# Patient Record
Sex: MALE | Race: WHITE | HISPANIC OR LATINO | Employment: FULL TIME | URBAN - METROPOLITAN AREA
[De-identification: names, ages, dates, MRNs, and addresses within clinical notes are randomized per-mention and may not be internally consistent; named-entity substitution may affect disease eponyms.]

---

## 2022-12-12 NOTE — PROGRESS NOTES
Name: Leslee Saavedra      : 1980      MRN: [id number]  Encounter Provider: JASWANT Chang  Encounter Date: 2022   Encounter department: [address] [place] [address]     1  Encounter for medical examination to establish care    2  Primary hypertension  Assessment & Plan:  BP Readings from Last 3 Encounters:   22 138/82     - Continue vasotec 20 mg daily  -reviewed eating a low salt diet (such as the DASH diet), limiting alcohol, exercising, and wt loss  Orders:  -     enalapril (VASOTEC) 20 mg tablet; Take 1 tablet (20 mg total) by mouth daily    3  Obesity (BMI 30-39  9)  Assessment & Plan:  BMI Counseling: Body mass index is 30 54 kg/m²  The BMI is above normal  Nutrition recommendations include reducing portion sizes, decreasing overall calorie intake, 3-5 servings of fruits/vegetables daily, reducing fast food intake, consuming healthier snacks, decreasing soda and/or juice intake, moderation in carbohydrate intake, increasing intake of lean protein, reducing intake of saturated fat and trans fat and reducing intake of cholesterol  Exercise recommendations include moderate aerobic physical activity for 150 minutes/week  Orders:  -     Hemoglobin A1C; Future  -     Basic metabolic panel; Future  -     Lipid Panel with Direct LDL reflex; Future    4  Encounter for immunization  -     Age 15 y+, BOOSTER (BIVALENT): Kris Chapin vac bivalent saul-sucr    5  Neck pain  Assessment & Plan:  - Recommend heat/ ice application, otc topicals, home exercises, and tylenol 500 mg prn if non oral medication methods fail   - Cannot do PT at this time due to costs  Pt is uninsured  6  Weight loss  Assessment & Plan:  - Will check A1c  - If a1c does not show diabetes, consider other further testing to assess for other causes  BMI Counseling: Body mass index is 30 54 kg/m²   The BMI is above normal  Nutrition recommendations include decreasing portion sizes, encouraging healthy choices of fruits and vegetables, decreasing fast food intake, consuming healthier snacks, limiting drinks that contain sugar, moderation in carbohydrate intake, increasing intake of lean protein, reducing intake of saturated and trans fat and reducing intake of cholesterol  Exercise recommendations include moderate physical activity 150 minutes/week  No pharmacotherapy was ordered  Rationale for BMI follow-up plan is due to patient being overweight or obese  Depression Screening and Follow-up Plan: Patient was screened for depression during today's encounter  They screened negative with a PHQ-2 score of 0  Subjective      Chloe Grewal is a 38 y o  male  With pmh of HTN  has no past surgical history on file  He presents today to [facility]  Neck Pain  Patient complains of neck pain  Event that precipitated these symptoms: none known  Onset of symptoms multiple years ago, and have been unchanged since that time  Current symptoms are pain in posterior neck (aching and throbbing in character; 5/10 in severity)  Patient denies numbness, paresthesias, stiffness and weakness in  Patient has had no prior neck problems  Previous treatments include: massage with relief  He also reports unwanted weight loss over the last year  He was previously weighing around 220-225 and he is down about 30 lbs  He denies constitutional symptoms as per ROS  He also reports polydipsia, polyuria, and fatigue  He reports a family hx of diabetes  Review of Systems   Constitutional: Positive for fatigue and unexpected weight change  Negative for chills and fever  HENT: Negative for ear pain and sore throat  Eyes: Negative for pain and visual disturbance  Respiratory: Negative for cough and shortness of breath  Cardiovascular: Negative for chest pain and palpitations  Gastrointestinal: Negative for abdominal pain and vomiting     Endocrine: Positive for polydipsia and polyuria  Genitourinary: Negative for dysuria and hematuria  Musculoskeletal: Negative for arthralgias and back pain  Skin: Negative for color change and rash  Neurological: Negative for seizures and syncope  All other systems reviewed and are negative  No current outpatient medications on file prior to visit  Objective     /82 (BP Location: Left arm, Patient Position: Sitting, Cuff Size: Large)   Pulse 68   Temp 97 8 °F (36 6 °C) (Temporal)   Resp 18   Ht 5' 7" (1 702 m)   Wt 88 5 kg (195 lb)   SpO2 97%   BMI 30 54 kg/m²     Physical Exam  Vitals and nursing note reviewed  Constitutional:       General: He is not in acute distress  Appearance: He is obese  He is not ill-appearing  HENT:      Head: Normocephalic and atraumatic  Right Ear: External ear normal  There is no impacted cerumen  Left Ear: External ear normal  There is no impacted cerumen  Nose: Nose normal  No congestion  Eyes:      Extraocular Movements: Extraocular movements intact  Conjunctiva/sclera: Conjunctivae normal       Pupils: Pupils are equal, round, and reactive to light  Cardiovascular:      Rate and Rhythm: Normal rate and regular rhythm  Pulses: Normal pulses  Heart sounds: Normal heart sounds  No murmur heard  Pulmonary:      Effort: Pulmonary effort is normal       Breath sounds: Normal breath sounds  Abdominal:      General: Bowel sounds are normal       Palpations: Abdomen is soft  Tenderness: There is no abdominal tenderness  Musculoskeletal:         General: No tenderness  Normal range of motion  Cervical back: Normal range of motion  No tenderness  Skin:     General: Skin is warm and dry  Neurological:      General: No focal deficit present  Mental Status: He is alert and oriented to person, place, and time     Psychiatric:         Mood and Affect: Mood normal          Behavior: Behavior normal          Thought Content: Thought content normal          Judgment: Judgment normal        Jessica Blanchard

## 2022-12-13 ENCOUNTER — OFFICE VISIT (OUTPATIENT)
Dept: FAMILY MEDICINE CLINIC | Facility: CLINIC | Age: 42
End: 2022-12-13

## 2022-12-13 ENCOUNTER — APPOINTMENT (OUTPATIENT)
Dept: LAB | Facility: HOSPITAL | Age: 42
End: 2022-12-13

## 2022-12-13 VITALS
HEART RATE: 68 BPM | BODY MASS INDEX: 30.61 KG/M2 | TEMPERATURE: 97.8 F | DIASTOLIC BLOOD PRESSURE: 82 MMHG | SYSTOLIC BLOOD PRESSURE: 138 MMHG | RESPIRATION RATE: 18 BRPM | OXYGEN SATURATION: 97 % | HEIGHT: 67 IN | WEIGHT: 195 LBS

## 2022-12-13 DIAGNOSIS — I10 PRIMARY HYPERTENSION: ICD-10-CM

## 2022-12-13 DIAGNOSIS — E66.9 OBESITY (BMI 30-39.9): ICD-10-CM

## 2022-12-13 DIAGNOSIS — Z23 ENCOUNTER FOR IMMUNIZATION: ICD-10-CM

## 2022-12-13 DIAGNOSIS — M54.2 NECK PAIN: ICD-10-CM

## 2022-12-13 DIAGNOSIS — Z00.00 ENCOUNTER FOR MEDICAL EXAMINATION TO ESTABLISH CARE: Primary | ICD-10-CM

## 2022-12-13 DIAGNOSIS — R63.4 WEIGHT LOSS: ICD-10-CM

## 2022-12-13 PROBLEM — Z59.89 UNINSURED: Status: ACTIVE | Noted: 2022-12-13

## 2022-12-13 PROBLEM — Z59.71 UNINSURED: Status: ACTIVE | Noted: 2022-12-13

## 2022-12-13 LAB
ANION GAP SERPL CALCULATED.3IONS-SCNC: 9 MMOL/L (ref 5–14)
BUN SERPL-MCNC: 11 MG/DL (ref 5–25)
CALCIUM SERPL-MCNC: 9 MG/DL (ref 8.4–10.2)
CHLORIDE SERPL-SCNC: 103 MMOL/L (ref 96–108)
CHOLEST SERPL-MCNC: 251 MG/DL
CO2 SERPL-SCNC: 25 MMOL/L (ref 21–32)
CREAT SERPL-MCNC: 0.52 MG/DL (ref 0.7–1.5)
GFR SERPL CREATININE-BSD FRML MDRD: 129 ML/MIN/1.73SQ M
GLUCOSE P FAST SERPL-MCNC: 320 MG/DL (ref 70–99)
HDLC SERPL-MCNC: 41 MG/DL
LDLC SERPL CALC-MCNC: 163 MG/DL
POTASSIUM SERPL-SCNC: 3.9 MMOL/L (ref 3.5–5.3)
SODIUM SERPL-SCNC: 137 MMOL/L (ref 135–147)
TRIGL SERPL-MCNC: 235 MG/DL

## 2022-12-13 RX ORDER — ENALAPRIL MALEATE 20 MG/1
20 TABLET ORAL DAILY
Qty: 90 TABLET | Refills: 1 | Status: SHIPPED | OUTPATIENT
Start: 2022-12-13

## 2022-12-13 NOTE — ASSESSMENT & PLAN NOTE
- Will check A1c  - If a1c does not show diabetes, consider other further testing to assess for other causes

## 2022-12-13 NOTE — ASSESSMENT & PLAN NOTE
- Recommend heat/ ice application, otc topicals, home exercises, and tylenol 500 mg prn if non oral medication methods fail   - Cannot do PT at this time due to costs  Pt is uninsured

## 2022-12-13 NOTE — ASSESSMENT & PLAN NOTE
BP Readings from Last 3 Encounters:   12/13/22 138/82     - Continue vasotec 20 mg daily  -reviewed eating a low salt diet (such as the DASH diet), limiting alcohol, exercising, and wt loss

## 2022-12-14 LAB
EST. AVERAGE GLUCOSE BLD GHB EST-MCNC: 315 MG/DL
HBA1C MFR BLD: 12.6 %

## 2022-12-29 ENCOUNTER — TELEPHONE (OUTPATIENT)
Dept: FAMILY MEDICINE CLINIC | Facility: CLINIC | Age: 42
End: 2022-12-29

## 2022-12-29 NOTE — TELEPHONE ENCOUNTER
first attempt to contact patient  no answer    called, mb is not set up yet, couldn't leave a message that appointment needs to be rescheduled   providers schedule changed

## 2022-12-30 NOTE — TELEPHONE ENCOUNTER
second attempt to contact patient  no answer      called, mb is not set up yet, couldn't leave a message that appointment needs to be rescheduled   providers schedule changed

## 2023-01-03 ENCOUNTER — OFFICE VISIT (OUTPATIENT)
Dept: FAMILY MEDICINE CLINIC | Facility: CLINIC | Age: 43
End: 2023-01-03

## 2023-01-03 VITALS
SYSTOLIC BLOOD PRESSURE: 128 MMHG | RESPIRATION RATE: 18 BRPM | BODY MASS INDEX: 30.59 KG/M2 | HEIGHT: 67 IN | DIASTOLIC BLOOD PRESSURE: 80 MMHG | HEART RATE: 87 BPM | TEMPERATURE: 97.8 F | WEIGHT: 194.9 LBS

## 2023-01-03 DIAGNOSIS — I10 PRIMARY HYPERTENSION: ICD-10-CM

## 2023-01-03 DIAGNOSIS — E11.9 DIABETES MELLITUS, NEW ONSET (HCC): Primary | ICD-10-CM

## 2023-01-03 DIAGNOSIS — E78.2 MIXED HYPERLIPIDEMIA: ICD-10-CM

## 2023-01-03 PROBLEM — R63.4 WEIGHT LOSS: Status: RESOLVED | Noted: 2022-12-13 | Resolved: 2023-01-03

## 2023-01-03 PROBLEM — Z00.00 ENCOUNTER FOR MEDICAL EXAMINATION TO ESTABLISH CARE: Status: RESOLVED | Noted: 2022-12-13 | Resolved: 2023-01-03

## 2023-01-03 RX ORDER — LANCETS 33 GAUGE
EACH MISCELLANEOUS
Qty: 300 EACH | Refills: 3 | Status: SHIPPED | OUTPATIENT
Start: 2023-01-03

## 2023-01-03 RX ORDER — BLOOD-GLUCOSE METER
KIT MISCELLANEOUS
Qty: 1 KIT | Refills: 0 | Status: SHIPPED | OUTPATIENT
Start: 2023-01-03

## 2023-01-03 RX ORDER — GLIPIZIDE 5 MG/1
5 TABLET ORAL
Qty: 90 TABLET | Refills: 1 | Status: SHIPPED | OUTPATIENT
Start: 2023-01-03

## 2023-01-03 RX ORDER — BLOOD SUGAR DIAGNOSTIC
STRIP MISCELLANEOUS
Qty: 300 EACH | Refills: 3 | Status: SHIPPED | OUTPATIENT
Start: 2023-01-03

## 2023-01-03 RX ORDER — ATORVASTATIN CALCIUM 40 MG/1
40 TABLET, FILM COATED ORAL EVERY EVENING
Qty: 90 TABLET | Refills: 1 | Status: SHIPPED | OUTPATIENT
Start: 2023-01-03 | End: 2023-07-02

## 2023-01-03 NOTE — PROGRESS NOTES
Name: Sangeeta Pool      : 1980      MRN: [id number]  Encounter Provider: JASWANT Trinidad  Encounter Date: 1/3/2023   Encounter department: [address] [place] [address]     1  Diabetes mellitus, new onset [facility])  Assessment & Plan:    Lab Results   Component Value Date    HGBA1C 12 6 (H) 2022   -Reviewed pathophysiology of diabetes  -Discussed lifestyle management to aid diabetes management such as tobacco cessation, limiting alcohol, eating healthy, carb counting, exercise, and weight loss   -Educated pt on complications of uncontrolled diabetes & persistent hyperglycemia, including but not limited to DKA/HHS, CKD, nerve damage, heart disease, stroke, extremity amputation, vision loss  -Denies excessive thirst, excessive urination, blurry vision, confusion  -Will start Metformin 1000 mg bid ac & glipizide 5 mg bid ac  -Reviewed symptoms of hypoglycemia (palpitations, shakiness, anxiety, confusion)  -Reviewed symptoms of hyperglycemia (increased thirst, increased urination, confusion)  -Reviewed importance of follow ups and calling office if pt does not tolerate medications or runs out of medication   -Discussed preprandial and postprandial BG goals              Orders:  -     Ambulatory referral to Diabetic Education; Future; Expected date: 2023  -     atorvastatin (LIPITOR) 40 mg tablet; Take 1 tablet (40 mg total) by mouth every evening  -     Blood Glucose Monitoring Suppl (OneTouch Verio Reflect) w/Device KIT; Check blood sugars three times daily  Please substitute with appropriate alternative as covered by patient's insurance  Dx: E11 65  -     glucose blood (OneTouch Verio) test strip; Check blood sugars three times daily  Please substitute with appropriate alternative as covered by patient's insurance  Dx: E11 65  -     OneTouch Delica Lancets 51U MISC; Check blood sugars three times daily   Please substitute with appropriate alternative as covered by patient's insurance  Dx: E11 65  -     Hemoglobin A1C; Standing  -     Lipid Panel with Direct LDL reflex; Future  -     Microalbumin / creatinine urine ratio  -     Hemoglobin A1C  -     metFORMIN (GLUCOPHAGE) 1000 MG tablet; Take 1 tablet (1,000 mg total) by mouth 2 (two) times a day with meals  -     glipiZIDE (GLUCOTROL) 5 mg tablet; Take 1 tablet (5 mg total) by mouth daily with breakfast    2  Mixed hyperlipidemia  Assessment & Plan:  Lab Results   Component Value Date    CHOLESTEROL 251 (H) 12/13/2022     Lab Results   Component Value Date    HDL 41 12/13/2022     Lab Results   Component Value Date    TRIG 235 (H) 12/13/2022     No results found for: [facility]  Start Lipitor 40 mg daily  Orders:  -     atorvastatin (LIPITOR) 40 mg tablet; Take 1 tablet (40 mg total) by mouth every evening    3  Primary hypertension  Assessment & Plan:  BP Readings from Last 3 Encounters:   01/03/23 128/80   12/13/22 138/82     - Continue Enalapril 20 mg daily           Raquel Gonzalez is a 38 y o  male who presents today for a new diabetic visit  Review of Systems   Constitutional: Positive for fatigue and unexpected weight change  Negative for chills and fever  HENT: Negative for ear pain and sore throat  Eyes: Negative for pain and visual disturbance  Respiratory: Negative for cough and shortness of breath  Cardiovascular: Negative for chest pain and palpitations  Gastrointestinal: Negative for abdominal pain and vomiting  Endocrine: Positive for polydipsia and polyuria  Genitourinary: Negative for dysuria and hematuria  Musculoskeletal: Positive for neck pain  Negative for arthralgias and back pain  Skin: Negative for color change and rash  Neurological: Negative for seizures and syncope  All other systems reviewed and are negative        Current Outpatient Medications on File Prior to Visit   Medication Sig   • enalapril (VASOTEC) 20 mg tablet Take 1 tablet (20 mg total) by mouth daily       Objective     /80 (BP Location: Left arm, Patient Position: Sitting, Cuff Size: Standard)   Pulse 87   Temp 97 8 °F (36 6 °C) (Temporal)   Resp 18   Ht 5' 7" (1 702 m)   Wt 88 4 kg (194 lb 14 4 oz)   BMI 30 53 kg/m²     Physical Exam  Vitals and nursing note reviewed  Constitutional:       General: He is not in acute distress  Appearance: He is obese  He is not ill-appearing  HENT:      Head: Normocephalic and atraumatic  Right Ear: External ear normal  There is no impacted cerumen  Left Ear: External ear normal  There is no impacted cerumen  Nose: Nose normal  No congestion  Mouth/Throat:      Mouth: Mucous membranes are moist       Pharynx: Oropharynx is clear  Eyes:      Extraocular Movements: Extraocular movements intact  Conjunctiva/sclera: Conjunctivae normal       Pupils: Pupils are equal, round, and reactive to light  Cardiovascular:      Rate and Rhythm: Normal rate and regular rhythm  Pulses: Normal pulses  no weak pulses          Dorsalis pedis pulses are 2+ on the right side and 2+ on the left side  Posterior tibial pulses are 2+ on the right side and 2+ on the left side  Heart sounds: Normal heart sounds  No murmur heard  Pulmonary:      Effort: Pulmonary effort is normal       Breath sounds: Normal breath sounds  Abdominal:      General: Bowel sounds are normal       Palpations: Abdomen is soft  Tenderness: There is no abdominal tenderness  Musculoskeletal:         General: No tenderness  Normal range of motion  Cervical back: Normal range of motion  No tenderness  Feet:      Right foot:      Skin integrity: Dry skin present  No ulcer, skin breakdown, erythema, warmth or callus  Left foot:      Skin integrity: Dry skin present  No ulcer, skin breakdown, erythema, warmth or callus  Skin:     General: Skin is warm and dry     Neurological:      General: No focal deficit present  Mental Status: He is alert and oriented to person, place, and time  Psychiatric:         Mood and Affect: Mood normal          Behavior: Behavior normal          Thought Content: Thought content normal          Judgment: Judgment normal       Patient's shoes and socks removed  Right Foot/Ankle   Right Foot Inspection  Skin Exam: skin normal, skin intact and dry skin  No warmth, no callus, no erythema, no maceration, no abnormal color, no pre-ulcer, no ulcer and no callus  Toe Exam: ROM and strength within normal limits  Sensory   Vibration: intact  Monofilament testing: intact    Vascular  Capillary refills: < 3 seconds  The right DP pulse is 2+  The right PT pulse is 2+  Left Foot/Ankle  Left Foot Inspection  Skin Exam: skin normal, skin intact and dry skin  No warmth, no erythema, no maceration, normal color, no pre-ulcer, no ulcer and no callus  Toe Exam: ROM and strength within normal limits  Sensory   Vibration: intact  Monofilament testing: intact    Vascular  Capillary refills: < 3 seconds  The left DP pulse is 2+  The left PT pulse is 2+       Assign Risk Category  No deformity present  No loss of protective sensation  No weak pulses  Risk: [facility]

## 2023-01-03 NOTE — ASSESSMENT & PLAN NOTE
Lab Results   Component Value Date    HGBA1C 12 6 (H) 12/13/2022   -Reviewed pathophysiology of diabetes  -Discussed lifestyle management to aid diabetes management such as tobacco cessation, limiting alcohol, eating healthy, carb counting, exercise, and weight loss   -Educated pt on complications of uncontrolled diabetes & persistent hyperglycemia, including but not limited to DKA/HHS, CKD, nerve damage, heart disease, stroke, extremity amputation, vision loss    -Denies excessive thirst, excessive urination, blurry vision, confusion  -Will start Metformin 1000 mg bid ac & glipizide 5 mg bid ac  -Reviewed symptoms of hypoglycemia (palpitations, shakiness, anxiety, confusion)  -Reviewed symptoms of hyperglycemia (increased thirst, increased urination, confusion)  -Reviewed importance of follow ups and calling office if pt does not tolerate medications or runs out of medication   -Discussed preprandial and postprandial BG goals

## 2023-01-03 NOTE — ASSESSMENT & PLAN NOTE
Lab Results   Component Value Date    CHOLESTEROL 251 (H) 12/13/2022     Lab Results   Component Value Date    HDL 41 12/13/2022     Lab Results   Component Value Date    TRIG 235 (H) 12/13/2022     No results found for: [place]  Start Lipitor 40 mg daily

## 2023-01-03 NOTE — PATIENT INSTRUCTIONS
Control de la diabetes tipo 2 en los adultos   CUIDADO AMBULATORIO:   La diabetes tipo 2 es cyril enfermedad que afecta la forma en que el cuerpo utiliza la glucosa (azúcar)  El cuerpo no puede producir suficiente insulina o es incapaz de usarla adecuadamente  Es importante controlar la diabetes para evitar el daño al corazón, los vasos sanguíneos y otros órganos  Pídale a alguien que llame al número de emergencias local (911 en los [place]) si:  · No es posible despertarlo  · Tiene signos de cetoacidosis diabética:     ? confusión, fatiga    ? vómitos    ? latidos cardíacos rápidos    ? aliento con [employer] a frutas    ? sed extrema    ? sequedad en la boca y la piel    · Tiene alguno de los siguientes signos de un ataque cardíaco:      ? Estrujamiento, presión o tensión en matthews pecho    ? Usted también podría presentar alguno de los siguientes:     § Malestar o dolor en matthews espalda, ludivina, mandíbula, abdomen, o brazo    § Falta de [employer] o vómitos    § Desvanecimiento o sudor frío repentino    · Usted tiene alguno de los siguientes signos de derrame cerebral:      ? Adormecimiento o caída de un lado de matthews montez    ? Debilidad en un [place] o cyril pierna    ? Confusión o debilidad para hablar    ? Mareos o dolor de markos intenso, o pérdida de la visión  Llame a matthews médico o al equipo de atención diabética si:  · Tiene cyril llaga o cyril herida que no cicatrizan  · Tiene un cambio en la cantidad de [facility]  · Deep niveles de azúcar en la jatinder son superiores a las metas fijadas  · Usted a menudo tiene niveles de azúcar en la jatinder más bajos que deep metas fijadas  · Matthews piel está enrojecida, seca, caliente al tacto o inflamada  · Usted tiene problemas para sobrellevar matthews diabetes o se siente ansioso o deprimido  · Usted tiene preguntas o inquietudes acerca de matthews condición o cuidado      Lo que usted necesita saber sobre los niveles altos de azúcar en la jatinder: El azúcar alto en la jatinder puede no causar ningún síntoma  Puede sentir más sed u orinar con más frecuencia de lo habitual  Con el tiempo, los niveles altos de azúcar en la jatinder pueden dañar deep nervios, vasos sanguíneos, tejidos y órganos  Lo siguiente aumenta los niveles de azúcar en la jatinder:  · Comidas copiosas o grandes cantidades de carbohidratos de cyril marla vez    · Menos actividad física    · Estrés    · Enfermedad    · Cyril dosis ayan de medicamento o [place], o cyril dosis tardía    Lo que usted necesita saber sobre los niveles bajos de azúcar en la jatinder: Usted puede prevenir síntomas millie temblores, mareos, irritabilidad o confusión asegurándose de que matthews azúcar en la jatinder no baje demasiado  · Trate un bajón de azúcar inmediatamente  ? Marianne 4 onzas de jugo o 1 tubo de gel de glucosa  ? Revise nuevamente matthews nivel de azúcar en la jatinder en 10 a 15 minutos  ? Cuando el nivel regrese a la normalidad, coma un alimento o refrigerio para prevenir otro bajón  · Lleve siempre consigo gel de glucosa, uvas pasas o caramelos duros para tratar los niveles bajos de azúcar en la jatinder  · Matthews azúcar en la jatinder puede bajar demasiado si sin un medicamento para la diabetes o la insulina y no consume suficientes alimentos  · Si [place] insulina, verifique matthews nivel de azúcar en la jatinder antes de hacer ejercicio  ? Si matthews nivel de azúcar en la jatinder es inferior a 100 mg/dL, coma 4 galletas, 2 onzas de uvas pasas o marianne 4 onzas de jugo  ? Compruebe matthews nivel cada 30 minutos si hace ejercicio akilah más de 1 hora  ? Puede que necesite cyril merienda akilah o después de hacer ejercicio  Qué puede hacer para manejar deep niveles de azúcar en la jatinder:  · Revise deep niveles de azúcar en la jatinder según las indicaciones y según sea necesario  Hay varios elementos disponibles que puede utilizar para comprobar deep [place]  Puede que tenga que comprobarlo probando cyril gota de Nicole Knight en un medidor de glucosa   En matthews lugar, es posible que le den un monitor continuo de glucosa (MCG)  El dispositivo se lleva puesto en todo momento  El MCG revisa matthews nivel de azúcar en la jatinder cada 5 minutos  [employer] a un dispositivo electrónico, millie un teléfono inteligente  Un MCG puede utilizarse con o sin cyril bomba de insulina  Hable con matthews médico para averiguar cuál es el [address] para usted  El objetivo de los niveles de azúcar en la jatinder antes de las comidas es entre 80 y 130 mg/dL y 2 horas después de comer  es inferior a 180 mg/dL  · Elija opciones de alimentos saludables  Consulte con matthews dietista para crear un plan de comidas que funcione para usted y deep horarios  Un dietista puede ayudarlo para que aprenda cómo alimentarse victoria con la cantidad [place] de carbohidratos akilah las comidas y [place]  Los carbohidratos pueden subir matthews azúcar en la jatinder si usted come demasiado a la vez  Algunos ejemplos de alimentos que contienen carbohidratos son panes, cereales, arroz, pasta y dulces  · Realice actividad física regularmente  La actividad física puede ayudarlo a alcanzar matthews objetivo de nivel de azúcar en jatinder y a controlar amtthews peso  Ayana al menos 150 minutos de [place] física Khanh de moderada a vigorosa cada semana  No deje de realizarla akilah más de 2 días seguidos  No permanezca sentada por más de 30 minutos cada vez  Matthews médico puede ayudarle a crear un plan de actividades  El plan puede incluir las mejores actividades para usted y puede ayudarlo a desarrollar matthews fuerza y [place]  · Mantenga un peso saludable  Pregúntele a matthews médico cuál es el peso ideal para usted  Pídale que lo ayude a crear un plan seguro para bajar de peso si tiene sobrepeso  · Tómese matthews medicamento para la diabetes o la insulina según las indicaciones  Es posible que necesite medicamento para la diabetes, insulina o ambos para controlar deep niveles de glucosa en jatinder   Matthews médico le indicará cómo y cuándo se debe erica matthews medicamento de diabetes o la insulina  También se le enseñarán los efectos secundarios que pueden causar los medicamentos orales para la diabetes  La insulina puede administrarse mediante inyección o cyril bomba de insulina o cyril pluma  Usted y matthews equipo de atención analizarán qué método es mejor para usted  ? La bomba de insulina es un dispositivo implantado que le da insulina las 24 horas del día  Cyril bomba de insulina previene la necesidad de inyecciones múltiples de insulina en un día  ? La pluma para insulina es un dispositivo precargado con la cantidad [place] de insulina  ? A usted y matthews heidi les enseñarán cómo preparar y administrar la insulina si alber es el mejor método para usted  El equipo de educación también le enseñará cómo desechar las agujas y De candido  ? Aprenderá la cantidad de insulina que necesita y cuándo administrarla  Se le enseñará cuándo no administrar la insulina  También se le enseñará lo que debe hacer si matthews nivel de azúcar en la jatinder baja demasiado  [place] puede suceder si usted sin insulina y no come la cantidad [place] de carbohidratos  Otras cosas que puede hacer para controlar la diabetes tipo 2:  · Use identificación de alerta médica  Use un brazalete o collar de alerta médica o lleve consigo cyril tarjeta que indique que tiene diabetes  Pregunte a matthews médico dónde puede conseguir esos artículos  · No fume  La nicotina y otras sustancias químicas de los cigarrillos y los cigarros pueden dañar el pulmón y los vasos sanguíneos  También dificulta el control de la diabetes  Pida información a matthews médico si usted actualmente fuma y necesita ayuda para dejar de fumar  No use cigarrillos electrónicos o tabaco sin humo en vez de cigarrillos o para tratar de dejar de fumar  Todos estos aún contienen nicotina  · Revise deep pies todos los días por cortadas, raspones, callos u otras heridas   Alber pendiente de enrojecimiento e inflamación y de calor al tacto  Use zapatos que le calcen victoria  Compruebe que no haya piedras u otros objetos dentro de deep zapatos que le podrían [facility]  No camine descalzo ni use zapatos sin calcetines  Use calcetines de algodón para ayudar a [place]  · Pregunte sobre las vacunas que pudiera necesitar  Usted corre un mayor riesgo de presentar enfermedades graves si se contagia gripe, neumonía, COVID-19 o hepatitis  Pregunte a matthews médico si debe vacunarse para prevenir estas u otras enfermedades, y cuándo debe hacerlo  · Hable con matthews equipo de atención médica si se siente estresado por el cuidado de la diabetes  A veces, encajar el cuidado de la diabetes en matthews augusta puede provocar un aumento del estrés  El estrés puede hacer que no se cuide [facility]  Matthews equipo de atención médica puede ayudarlo con consejos sobre el autocuidado  Matthews equipo de [facility] puede sugerirle que hable con un profesional de la marcial mental  [place] profesional puede escuchar y ofrecer ayuda en cuestiones de autocuidado  Acuda a deep consultas de control con matthews médico o con el equipo de cuidado de la diabetes según le indicaron: Es posible que a usted le bayron análisis de jatinder antes de la angie de control  Los [employer] de las pruebas mostrarán si es necesario hacer cambios en el tratamiento o en los cuidados personales  Anote deep preguntas para que se acuerde de hacerlas akilah deep visitas  Hable con matthews médico si no puede pagar deep medicamentos  © Copyright [employer] 2022 Information is for End User's use only and may not be sold, redistributed or otherwise used for commercial purposes  All illustrations and images included in CareNotes® are the copyrighted property of A D A M , Inc  or [address] [place] [address] es sólo para uso en educación  Matthews intención no es darle un consejo médico sobre enfermedades o tratamientos   Colsulte con matthews médico, enfermera o farmacéutico antes de seguir cualquier régimen médico para saber si es seguro y efectivo para usted  Diabetes tipo 2 en adultos: [place] diagnóstico   CUIDADO AMBULATORIO:   La diabetes tipo 2 es cyril enfermedad que afecta la forma en que el cuerpo utiliza la glucosa (azúcar)  Generalmente, cuando el nivel de azúcar en la jatinder Rolanda, el páncreas produce más insulina  La insulina ayuda al cuerpo a extraer el azúcar de la jatinder con el fin de usarla millie ilsa de [place]  La diabetes mellitus tipo 2 se desarrolla ya sea porque el cuerpo no puede producir suficiente insulina, o es incapaz de usarla adecuadamente  La diabetes tipo 2 puede ser controlada para evitar daño a matthews Ransom Dieter y otros órganos  Los síntomas más comunes incluyen los siguientes:  · Adormecimiento en los dedos de la mano o el pie    · Visión borrosa    · Orina con frecuencia    · Más hambre o sed de lo usual    Pídale a alguien que llame al Elijah Lopez de emergencias local (911 en los [place]) si:  · Usted tiene alguno de los siguientes signos de derrame cerebral:      ? Adormecimiento o caída de un lado de matthews montez    ? Debilidad en un Cesar Sickle o cyril pierna    ? Confusión o debilidad para hablar    ? Mareos o dolor de markos intenso, o pérdida de la visión  · Tiene alguno de los siguientes signos de un ataque cardíaco:      ? Estrujamiento, presión o tensión en matthews pecho    ? Usted también podría presentar alguno de los siguientes:     § Malestar o dolor en matthews espalda, ludivina, mandíbula, abdomen, o brazo    § Falta de [employer] o vómitos    § Desvanecimiento o sudor frío repentino    · Usted tiene dificultad para respirar  Busque atención médica de inmediato si:  · Usted tiene dolor abdominal intenso  · Vomita por más de 2 horas  · Usted tiene dificultad para permanecer despierto o concentrarse  · Usted está temblando o sudando      · Usted se siente débil o más cansado de lo habitual     · Usted tiene visión borrosa o doble     · Matthews aliento huele a fruta o chery  Llame a matthews médico o al equipo de atención diabética si:  · Tiene hinchazón en los brazos y las piernas  · Tiene malestar estomacal y no puede ingerir los alimentos de matthews plan de comidas  · Usted tiene [place], tiff de [place] o se irrita con facilidad  · Matthews piel está oneal, tibia, seca o inflamada  · Usted tiene cyril herida que no cicatriza  · Usted tiene entumecimiento en los brazos o piernas  · Usted tiene problemas para sobrellevar matthews enfermedad, o se siente ansioso o deprimido  · Usted tiene preguntas o inquietudes acerca de matthews condición o cuidado  El tratamiento para la diabetes tipo 2 ayuda a prevenir o retrasar las complicaciones, incluyendo la enfermedad cardíaca y renal  Usted debe comer alimentos saludables y hacer actividad física regularmente  Es posible que usted necesite alguno de los siguientes:  · Medicamentos para la diabetes o insulina se pueden administrar para disminuir la cantidad de azúcar en matthews jatinder  · Medicamentos para la presión arterial podrían administrarse para disminuir matthews presión arterial     · Medicamento para disminuir el colesterol podría administrarse para evitar cyril enfermedad cardíaca  · Los medicamentos antiplaquetarios , millie la aspirina, [facility] a prevenir coágulos de [place]  [place] deep antiplaquetarios exactamente millie se le haya indicado  Estos medicamentos podrían causar mas probabilidad para sangrado y para desarrollar moretones  Si le leblanc indicado el uso de aspirina, no tome acetaminofén o ibuprofeno en matthews lugar  · [place] deep medicamentos millie se le haya indicado  Consulte con matthews médico si usted jessica que matthews medicamento no le está ayudando o si presenta efectos secundarios  Infórmele si es alérgico a cualquier medicamento  Mantenga cyril lista actualizada de los [place], las vitaminas y los productos herbales que sin   Incluya los siguientes datos de los medicamentos: cantidad, frecuencia y motivo de administración  Traiga con usted la lista o los envases de las píldoras a deep citas de seguimiento  Lleve la lista de los medicamentos con usted en zuhair de cyril emergencia  Educación sobre la diabetes: La educación sobre la diabetes se iniciará inmediatamente  La educación sobre la diabetes también puede tener lugar más tarde para refrescar matthews memoria  Matthews equipo de atención de la diabetes puede incluir médicos, enfermeras especializadas y asistentes médicos  También puede incluir enfermeras, dietistas, especialistas en ejercicio, farmacéuticos, dentistas y podiatras  Los [employer] de la heidi u otras personas que están cerca de usted también pueden ser parte del equipo  Usted y matthews equipo establecerán metas y harán planes para manejar la diabetes y [de-identified] problemas de [place]  Los planes y las metas serán específicos de deep necesidades  Los miembros del equipo de cuidado de la diabetes le enseñarán lo siguiente:  · Acerca de la nutrición: Un dietista le ayudará a diseñar un plan de alimentación para mantener estable matthews nivel de azúcar en la jatinder  Usted aprenderá cómo la comida afecta deep niveles de azúcar en la jatinder  [place]line Fast a realizar un seguimiento del azúcar y los alimentos que contienen almidón (carbohidratos)  No se salte ninguna comida  Matthews nivel de azúcar en la jatinder puede bajar demasiado si usted ha tomado medicamento para la diabetes y no ha comido  Es posible que le enseñen a utilizar el método del plato para controlar las porciones  Con el método del plato, la mitad de matthews plato contiene vegetales  La otra mitad se divide para que un cuarto tenga carne o proteína y un cuarto tenga almidones, millie las radha  · Actividad física y diabetes: Usted aprenderá por qué la actividad física, millie caminar, es importante  Usted y matthews médico del equipo de cuidados de marcial harán un plan de matthews actividad   Matthews médico del equipo de cuidados de marcial le indicará cuál es el peso saludable para usted  Lo ayudará a hacer un plan para llegar a gerardo peso y permanecer allí  Mantenga un peso saludable para ayudar a retrasar o prevenir las complicaciones de la diabetes  · Nivel de azúcar en la jatinder: Usted aprenderá lo que debe ser matthews nivel de azúcar en la jatinder  Se le dará información sobre cuándo y cómo comprobar matthews nivel de azúcar en la jatinder  Puede que tenga que comprobarlo probando cyril gota de Nicole Knight en un medidor de glucosa  En matthews lugar, es posible que le den un monitor continuo de glucosa (MCG)  Se coloca un sensor en el abdomen o en el brazo  Se coloca un transmisor en el sensor para obtener cyril lectura que aparece en el monitor  Usted aprenderá qué hacer si matthews nivel de azúcar en la jatinder es demasiado alto o demasiado bajo  Anote los horarios de las comprobaciones y los niveles de azúcar en la jatinder  Lleve los registros a todas las citas de control  · Sobre los medicamentos para la diabetes: Es posible que necesite medicamento oral para la diabetes, insulina o ambos para controlar deep niveles de glucosa en jatinder  Matthews médico le indicará cómo y cuándo debe erica medicamento oral para la diabetes  También se le enseñarán los efectos secundarios que pueden causar los medicamentos orales para la diabetes  Se puede añadir insulina si los medicamentos orales para la diabetes pierden eficacia con el tiempo  La insulina puede administrarse mediante inyección o cyril bomba de insulina o cyril pluma  Usted y matthews equipo de atención analizarán qué método es mejor para usted  ? La bomba de insulina es un dispositivo implantado que le da insulina las 24 horas del día  Cyril bomba de insulina previene la necesidad de inyecciones múltiples de insulina en un día  ? La pluma para insulina es un dispositivo precargado con la cantidad [place] de insulina  ? A usted y matthews heidi les enseñarán cómo preparar y administrar la insulina si alber es el mejor método para usted   El equipo de educación también le enseñará cómo desechar las agujas y De candido  ? Aprenderá la cantidad de insulina que necesita y cuándo administrarla  Se le enseñará cuándo no administrar la insulina  También se le enseñará lo que debe hacer si matthews nivel de azúcar en la jatinder baja demasiado  [place] puede suceder si usted sin insulina y no come la cantidad [place] de carbohidratos  Otras maneras para ayudarlo a controlar la diabetes tipo 2:  · Hable con matthews equipo de atención médica si siente más estrés por matthews diagnóstico  Si se siente estresado por matthews diagnóstico, esto puede hacer que no se cuide adecuadamente  Matthews equipo de atención médica puede ayudarlo con consejos sobre el autocuidado  Matthews equipo de [facility] puede sugerirle que hable con un profesional de la marcial mental  [place] profesional puede escuchar y ofrecer ayuda en cuestiones de autocuidado  · Revise deep pies todos los días para emily si tienen llagas  Use zapatos y calcetines que le queden victoria  No se recorte las [employer]  Edythe Martín a un podólogo  Pregunte a matthews equipo de cuidados de marcial por más información sobre el cuidado del pie  · No fume  La nicotina y otros químicos en los cigarrillos y puros pueden causar daño pulmonar y dificultar el manejo de la diabetes  Pida al médico de matthews equipo de cuidados de marcial información si usted fuma actualmente y [place] para dejar de hacerlo  Los cigarrillos electrónicos o el tabaco sin humo igualmente contienen nicotina  Consulte con matthews médico del equipo de cuidados de marcial antes de utilizar estos productos  · Vonnie agua en lugar de bebidas azucaradas, millie gaseosas y [place] de frutas  Las bebidas azucaradas provocan un alto nivel de azúcar en la jatinder y causan un aumento de [place]  Es posible que matthews médico le diga que las bebidas dietéticas no ayudan a perder peso  · Conozca los riesgos si decide beber alcohol   El alcohol puede causar que deep niveles de azúcar en la jatinder estén bajos si [place] insulina  El alcohol puede causar [employer] de azúcar en la jatinder y [place] de peso si luís demasiado  Un trago equivale a 12 onzas de cerveza, 5 onzas de vino o 1 onza y ½ de licor  Matthews equipo de [employer] dirá cuántas bebidas puede erica en 24 horas y en 1 semana  · Tómese la presión arterial mckayla millie le indicaron  Si tiene presión arterial (PA) bibi, hable con matthews equipo de atención sobre deep [facility]  Juntos pueden crear un plan para bajar la [place] si es necesario y [place] en un rango saludable  El plan puede incluir cambios de estilo de augusta o medicamentos  Hattie Lynn presión arterial normal es 119/79 o inferior  [place]esar [place] arterial normal puede ayudar a prevenir o retrasar determinadas complicaciones de la diabetes  Por ejemplo, retinopatía (daño ocular) y daño renal          · Use identificación de alerta médica  Use un brazalete o collar de alerta médica o lleve consigo cyril tarjeta que indique que tiene diabetes  Pregúntele al médico del equipo de cuidados de marcial dónde puede conseguir estos productos  · Pregunte sobre las vacunas que pudiera necesitar  Usted corre un mayor riesgo de presentar enfermedades graves si se contagia gripe, neumonía, COVID-19 o hepatitis  Pregúntele a matthews médico si usted debe recibir la vacuna contra la gripe, la neumonía o la hepatitis B y cuándo debe recibir la vacuna contra la COVID-19  Riesgos de la diabetes tipo 2: Es importante aprender a mantener la diabetes bajo control  La diabetes que no está controlada puede causar daño a matthews sistema nervioso, las venas y las arterias  El riesgo de demencia aumenta más rápidamente cuanto más tiempo no se controle la diabetes  Los [employer] de azúcar en la jatinder podrían dañar tejidos y órganos del cuerpo con el tiempo  El daño a las arterias [place] matthews riesgo de sufrir un ataque Loistine Babe y un derrame cerebral  El daño a los nervios puede llevar a otros problemas del Oswaldo Pilar y de los nervios   [place] [facility] diabetes es cyril enfermedad potencialmente mortal si no recibe tratamiento  Acuda a deep consultas de control con los médicos de matthews equipo de cuidados de marcial según le indicaron: Necesitará regresar para que le realicen el examen de hemoglobina A1c cada 3 meses  Es necesario que le revisen los pies por lo menos en 1 visita al [employer]  Necesitará de un examen de la vista cyril vez al año para revisar si tiene retinopatía  También necesitará exámenes de orina anuales para revisar si hay problemas renales  Es posible que necesite exámenes para determinar si hay enfermedad cardíaca millie un electrocardiograma, un examen de estrés, monitoreo de matthews presión arterial y análisis de [place]  Anote deep preguntas para que se acuerde de hacerlas akilah deep visitas  © Copyright [employer] 2022 Information is for End User's use only and may not be sold, redistributed or otherwise used for commercial purposes  All illustrations and images included in CareNotes® are the copyrighted property of [employer]  or [address] [place] [address] es sólo para uso en educación  Matthews intención no es darle un consejo médico sobre enfermedades o tratamientos  Colsulte con matthews [place] farmacéutico antes de seguir cualquier régimen médico para saber si es seguro y efectivo para usted  Conteo básico de carbohidratos   CUIDADO AMBULATORIO:   El conteo de carbohidratos es Carlos Alberto de planificar deep comidas contando la cantidad de carbohidratos de los alimentos  Mercedes Docker son los azúcares, almidones y fibras que se encuentran en las frutas, granos, verduras y productos lácteos  Los carbohidratos [employer] niveles de azúcar en la jatinder  El conteo de carbohidratos puede ayudarle a comer la cantidad [place] de carbohidratos para mantener deep niveles de glucosa (azúcar) en jatinder bajo control    Lo que necesita saber sobre la planificación de las comidas utilizando el conteo de carbohidratos:  · Un dietista o un médico le ayudará a desarrollar un plan alimenticio saludable que trabajará mejor para usted  Le enseñarán cuántos carbohidratos debe consumir o beber en cada comida o merienda  Matthews plan alimenticio estará basado en matthews edad, peso, dieta usual y [facility]  Si usted tiene diabetes, también incluirá matthews nivel de azúcar en jatinder y medicamentos para la diabetes  Cuando usted está informado de la cantidad de carbohidratos que debe consumir, entonces puede decidir los tipos de alimentos que quiere comer  · Necesitará saber qué alimentos contienen carbohidratos y cuántos contienen  Lleva la cuenta de la cantidad de carbohidratos en alimentos y meriendas para poder seguir matthews plan alimenticio  No evite los carbohidratos ni omita comidas  Si usted no consume suficiente cantidad de carbohidratos u omite comidas, los niveles de azúcar en matthews jatinder pueden bajar excesivamente  Alimentos que contienen carbohidratos:  · Panes: Cada porción de comida en la lista siguiente contiene cerca de 15 g de carbohidratos     ? 1 rebanada de pan (1 onza) o 1 tortilla de harina o maíz (6 pulgadas)    ? La ½ de pan para hamburguesa o ¼ de cyril rosquilla chas (aproximadamente 1 onza)    ? 1 panqueque (4 pulgadas en diámetro y ¼ de [place])    · Cereales y granos: El tamaño de las porciones de cereales listos para comer puede variar  Huey el tamaño de la porción y la cantidad de carbohidratos alistados en la etiqueta alimenticia  Cada porción de comida en la lista siguiente contiene cerca de 15 g de carbohidratos     ? ¾ taza de cereal seco, sin endulzar, listo para comer o ¼ taza de granola baja en grasa    ? ½ taza de katie u otros cereales cocidos    ? ? taza de arroz o pasta cocida    · Frijoles y vegetales con almidón: Cada porción de comida en la lista siguiente contiene cerca de 15 g de carbohidratos     ? ½ de taza de maíz, chícharos verdes, camote o puré de papa    ? ¼ de cyril papa chas horneada    ?  ½ taza de frijoles, lentejas y guisantes (garbanzo, jimenez, habichuela, medeiros, partido, de siobhan elly)    · Gilbert y meriendas: Cada porción de comida en la lista siguiente contiene cerca de 15 g de carbohidratos     ? 3 galletas de harina cuadradas u 8 galletas en forma de animalitos    ? 6 galletas saladas    ? 3 tazas de palomitas de maíz o ¾ onzas de pretzels, radha fritas o totopos    · Frutas: Cada porción de comida en la lista siguiente contiene cerca de 15 g de carbohidratos     ? 1 pieza pequeña (4 onzas) de fruta fresca o ¾ a 1 taza de fruta fresca    ? ½ taza de fruta enlatada o congelada, envasada en jugo natural    ? ½ taza (4 onzas) de [place] de fruta sin azúcar    ? 2 cucharadas de fruta seca    · Alimentos azucarados o postres: Cada porción de comida en la lista siguiente contiene cerca de 15 g de carbohidratos     ? 1 jacob de 2 pulgadas de pastel sin glaseado o brownie    ? 2 galletas dulces pequeñas    ? ½ taza de helado, yogur congelado o yogur congelado sin lactosa    ? ¼ de taza de sorbete    ? 1 cucharada de [place] regular, mermelada o jalea    ? 2 cucharadas de jarabe ligero    · Leche y yogur: Bevelyn Coaster contienen cerca de 12 g de carbohidratos por ración  ? 1 taza de leche sin grasa o baja en grasa    ? 1 taza de leche de soja    ? 1 taza de yogur sin grasa que ha sido endulzado con endulzante artificial    · Verduras sin almidón: Cada porción de comida contiene cerca de 5 g de carbohidratos Dane porciones de vegetales sin almidón cuentan millie 1 porción de carbohidratos  ? ½ de taza de verduras cocidas o 1 taza de verduras crudas Estas incluyen remolachas, bróculi, repollo, coliflor, pepino, champiñones, tomates y calabaza      ? ½ taza de jugo de verduras    Cómo utilizar el conteo de carbohidratos para planificar las comidas:  · [employer] las cantidades de carbohidratos usando el tamaño de las porciones:     ? Ejemplo de cyril ne de pasta: Planifica comer pasta, ensalada mixta y un vaso de 8 onzas de [place]  Matthews médico le dice que puede consumir 4 porciones de carbohidratos para la ne  Cyril porción de carbohidratos de pasta es ? de taza  Cyril taza de pasta será igual a 3 porciones de carbohidratos  Un vaso de 8 onzas de leche se cuenta millie 1 porción de carbohidratos  Estas cantidades de alimentos serían iguales a 4 porciones de carbohidratos  Cyril taza de ensalada mixta no cuenta para las porciones de carbohidratos  · Cuente la cantidad de carbohidratos utilizando las etiquetas alimenticias: Busque la cantidad total de los carbohidratos en los alimentos envasados leyendo la etiqueta alimenticia  Las etiquetas alimenticias explican el tamaño de la porción del alimento y la cantidad total de los carbohidratos en cada porción  Busque el tamaño de la porción en la etiqueta alimenticia y después decida cuántas porciones comerá  Multiplique el número de porciones que planea comer por la cantidad de carbohidratos por porción  ? Ejemplo de cyril merienda con Milli Carina nasima de granola: Matthews plan de comidas le permite consumir 2 porciones de carbohidratos (30 gramos) millie bocadillo  Planea comer 1 paquete de barras de granola, el cual contiene 2 barras  Según la etiqueta alimenticia, el tamaño de la porción en alber paquete es 1 nasima  Cada porción (1 nasima) contiene 25 gramos de carbohidratos  La cantidad total de carbohidratos en el paquete de barras de granola sería 50 gramos  Basándose en matthews plan alimenticio, usted debe de comer sólo 1 nasima de granola  Acuda a la consulta de control con matthews médico según las indicaciones: Anote deep preguntas para que se acuerde de hacerlas akilah deep visitas  © Copyright [facility] 2022 Information is for End User's use only and may not be sold, redistributed or otherwise used for commercial purposes   All illustrations and images included in CareNotes® are the copyrighted property of A D A M , [facility]  or [address] [place] [address] es sólo para Mj en educación  Matthews intención no es darle un consejo médico sobre enfermedades o tratamientos  Colsulte con matthews Freeda Oliveira farmacéutico antes de seguir cualquier régimen médico para saber si es seguro y efectivo para usted  Cuidado del pie para personas con diabetes   CUIDADO AMBULATORIO:   Lo que usted necesita saber acerca del cuidado del pie:  · El cuidado del pie ayuda a proteger matthews pies y evitar úlceras o llagas en el pie  Los [email or web address] de azúcar en la jatinder a laurent plazo pueden dañar los vasos sanguíneos y los nervios en caterina piernas y pies  Mariela daño dificulta que sienta presión, dolor, temperatura y el tacto  Es posible que usted no sienta cyril cortada o cyril úlcera o que los zapatos están muy apretados  El cuidado del pie es necesario para evitar problemas graves, millie cyril infección o cyril amputación  · La diabetes podría provocar que los dedos de caterina pies se tuerzan o se encorven [place]  Estos cambios podrían afectar la manera en que usted camina y pueden conllevar al aumento de la presión en matthews pie  La presión puede disminuir el flujo sanguíneo a caterina pies  La falta del flujo sanguíneo aumenta matthews riesgo de cyril úlcera en [employer]  No ignore problemas pequeños, millie la piel reseca o heridas pequeñas  Con el tiempo, estos puede representar cyril amenaza para la augusta si no se les da el cuidado apropiado  Llame al proveedor del equipo de cuidados de marcial si:  · Caterina pies se ponen entumecidos, débiles o difícil de   · Usted tiene pus drenando de cyril llaga en matthews pie  · Usted tiene cyril herida en matthews pie que se hace más chas, más profunda o que no errol  · Usted nota que tiene ampollas, cortadas, rasguños, callos o llagas en matthews pie  · Usted tiene fiebre y caetrina pies se ponen rojos, calientes e inflamados  · Las uñas de caterina pies se vuelven gruesas, encorvadas o [id number]  · Le es difícil revisarse los pies porque matthews visión no está victoria      · Usted tiene preguntas o inquietudes acerca de matthews condición o cuidado  Cómo cuidar de caterina pies:  · Revísese los pies a diario  Observe todo el pie, incluyendo la planta del pie, entre y [employer] dedos  Revise si hay heridas y callos  Use un mary ellen para verse la planta de los pies  La piel de los pies podría estar brillante, estirada o más obscura de lo normal  Caterina pies también podrían estar fríos y pálidos  Pase caterina rafael por encima, por debajo, por los lados y [employer] dedos del pie para sentir la piel  El enrojecimiento, inflamación y calor son signos de problemas con el flujo sanguíneo que pueden conllevar a cyril úlcera en el pie  No trate de quitarse los callos usted mismo  · [place] todos los días con agua tibia y [place]  No use [place], porque esto puede lesionarle el pie  Séquese los pies suavemente con cyril toalla después de lavarlos  Seque entre y [employer] dedos  · Aplique cyril loción o un humectante sobre caterina pies secos  Pregunte al médico del equipo de cuidados de marcial cuáles lociones son las mejores que puede usar  No  aplique loción o humectante entre caterina dedos  La [employer] dedos del pie podría causar rupturas de la piel  · [place] [address] uñas de los pies correctamente  Lime o katy las uñas de los pies en línea recta  Use un cepillo suave para limpiar alrededor [employer]  Si las [address] gruesas, es posible que necesite que un médico del equipo de cuidados de marcial o un especialista se las katy  · Proteja caterina pies  No  camine descalzo ni use zapatos sin calcetines  Compruebe que no haya piedras u otros objetos dentro de caterina zapatos que le podrían [facility] [place]  Use calcetines de algodón para ayudar a [employer]  Use calcetines sin costura en los dedos o póngaselos con la costura hacia afuera  Cámbiese los calcetines diariamente  No use calcetines sucios ni húmedos  · Use zapatos que le calcen victoria  Use zapatos que no rocen ninguna parte de caterina pies   Matthews calzado debería ser de ½ a ¾ pulgada (1 a 2 centímetros) más grandes que deep pies  Matthews calzado también debería tener espacio adicional alrededor de la parte más ancha de deep pies  El calzado para caminar o atlético con cordones o correas que se ajustan son los mejores  Pida ayuda al médico del equipo de cuidados de marcial para elegir un par de zapatos que le calcen victoria  Pregúntele si debe usar algún tipo de plantilla, soporte o vendaje en deep pies  · Asista a deep citas de seguimiento  El médico del equipo de cuidados de marcial giana Taoe Presser revisión a deep pies al menos cyril vez al [place]  Es posible que usted necesite hacerse un examen de pie más seguido si tiene los nervios dañados, deformidad en el pie o [place]  Él revisará si hay daño al nervio y qué tan victoria usted puede sentir deep pies  Él le revisará matthews calzado para emily si le [employer]  · No fume  Fumar puede dañar los vasos sanguíneos y aumentar matthews riesgo de úlceras en los pies  Pida al médico de matthews equipo de cuidados de marcial información si usted fuma actualmente y [place] para dejar de hacerlo  Los cigarrillos electrónicos o el tabaco sin humo igualmente contienen nicotina  Consulte con matthews médico del equipo de cuidados de marcial antes de utilizar estos productos  Acuda a deep consultas de control con el médico del equipo de cuidado de la diabetes o un especialista en pies según le indicaron: Usted va a necesitar que le revisen deep pies al menos cyril vez al [place]  Es posible que usted necesite hacerse un examen de pie más seguido si tiene los nervios dañados, deformidad en el pie o [place]  Anote deep preguntas para que se acuerde de hacerlas akilah deep visitas  © Copyright [employer] 2022 Information is for End User's use only and may not be sold, redistributed or otherwise used for commercial purposes   All illustrations and images included in CareNotes® are the copyrighted property of Lorne VELIZ  or [facility] información es sólo para uso en educación  Matthews intención no es darle un consejo médico sobre enfermedades o tratamientos  Colsulte con matthews Vesna Seals farmacéutico antes de seguir cualquier régimen médico para saber si es seguro y efectivo para usted  Jatin Brighter si matthews nivel de azúcar en la jatinder es bajo   CUIDADO AMBULATORIO:   Niveles bajos de azúcar en la jatinder (hipoglucemia) puede ocurrir con la diabetes tipo 1 y tipo 2  Los niveles bajos son más probables de ocurrir si usted [place] insulina  La hipoglucemia puede hacer que usted tenga caídas, accidentes y lesiones  Un nivel de azúcar en la jatinder que llega a estar demasiado bajo puede causar convulsiones, coma y la muerte  Aprenda a reconocer los síntomas temprano para que pueda obtener tratamiento rápidamente  Cuando matthews nivel de azúcar en la jatinder es [place], [place] sentirse:  · Sudoroso    · Nervioso o tembloroso    · Ansioso o irritable    · Confundido    · Tener latidos cardíacos rápidos o dulce    · Extremadamente hambriento    [place] a alguien que llame al número de emergencias local (911 en los [place]) si:  · No es posible despertarlo  · Usted sufre cyril convulsión  Llame a matthews médico si:  · Tiene síntomas de un nivel de baja azúcar en la jatinder, tales millie problemas pensando en sudoración y un latido carmelina  · Matthews nivel de azúcar en la jatinder está por debajo de lo normal y no mejora con el tratamiento  · Usted a menudo tiene niveles de azúcar en la jatinder más bajos que deep metas fijadas  · Usted tiene problemas para sobrellevar matthews enfermedad, o se siente ansioso o deprimido  · Usted tiene preguntas o inquietudes acerca de matthews condición o cuidado  Qué hacer si tiene síntomas de nivel bajo de azúcar en la jatinder:  · Compruebe matthews nivel de azúcar en la jatinder, si es posible  Matthews nivel de azúcar en jatinder está demasiado bajo si llega a menos de 70 mg/dl  · Coma o marianne 15 gramos de carbohidrato de acción rápida   Los carbohidratos de acción rápida aumentarán mathtews nivel de azúcar en la jatinder enseguida  Ejemplos de 15 gramos de carbohidrato de acción rápida:     ? 4 onzas (½ taza) de jugo de fruta    ? 4 onzas de gaseosa regular    ? 2 cucharadas de uvas pasas    ? 1 tubo de gel de glucosa o de 3 a 4 tabletas de glucosa       · Revise matthews nivel de azúcar en la jatinder 15 minutos más tarde  Si el nivel es todavía bajo (menos de 100 mg/dL), ingiera otros 15 gramos de carbohidratos  Cuando el nivel vuelva a 100 mg/dL, coma un refrigerio o alimento que contenga carbohidratos  [place] ayudará a evitar otra caída de matthews nivel de azúcar en la jatinder  · Enseñe a las personas cercanas a usted cómo utilizar el kit de glucagón  Matthews nivel de azúcar en la jatinder puede estar demasiado bajo para que usted esté despierto  Las personas necesitan saber cuándo y cómo utilizar el kit  Evite los niveles bajos de azúcar en la jatinder: Evite los niveles bajos de azúcar en la jatinder sabiendo lo que [place]  Consulte con matthews médico cuáles son las maneras de [employer] niveles bajos de azúcar en la jatinder  [place] de los siguientes factores puede aumentar matthews riesgo de niveles bajos de azúcar en la jatinder:  · Ayuno para realizarse pruebas o procedimientos    · Akilah o después del ejercicio intenso    · Comidas tardias o pospuestas    · Dormir (puede que necesite un refrigerio antes de dormir)    · Beber alcohol si [place] insulina o píldoras liberadoras de insulina    Acuda a la consulta de control con matthews médico según las indicaciones: Anote deep preguntas para que se acuerde de hacerlas akilah deep visitas  © Copyright [employer] 2022 Information is for End User's use only and may not be sold, redistributed or otherwise used for commercial purposes  All illustrations and images included in CareNotes® are the copyrighted property of Lorne VELIZ  or [address] [place] [address] es sólo para uso en educación   Matthews intención no es darle un consejo [facility] [employer] o tratamientos  Colsulte con matthews Burlene Edgar farmacéutico antes de seguir cualquier régimen médico para saber si es seguro y efectivo para usted

## 2023-01-03 NOTE — ASSESSMENT & PLAN NOTE
BP Readings from Last 3 Encounters:   01/03/23 128/80   12/13/22 138/82     - Continue Enalapril 20 mg daily

## 2023-01-10 ENCOUNTER — OFFICE VISIT (OUTPATIENT)
Dept: FAMILY MEDICINE CLINIC | Facility: CLINIC | Age: 43
End: 2023-01-10

## 2023-01-10 VITALS
HEART RATE: 75 BPM | WEIGHT: 199 LBS | SYSTOLIC BLOOD PRESSURE: 130 MMHG | OXYGEN SATURATION: 96 % | RESPIRATION RATE: 16 BRPM | HEIGHT: 67 IN | BODY MASS INDEX: 31.23 KG/M2 | DIASTOLIC BLOOD PRESSURE: 80 MMHG | TEMPERATURE: 98.7 F

## 2023-01-10 DIAGNOSIS — Z23 ENCOUNTER FOR IMMUNIZATION: ICD-10-CM

## 2023-01-10 DIAGNOSIS — E11.9 DIABETES MELLITUS, NEW ONSET (HCC): ICD-10-CM

## 2023-01-10 DIAGNOSIS — Z00.00 ANNUAL PHYSICAL EXAM: Primary | ICD-10-CM

## 2023-01-10 NOTE — PROGRESS NOTES
[address] [facility] [facility] PRACTICE JULIAN    NAME: Grazyna Orta  AGE: 38 y o  SEX: male  : 1980     DATE: 1/10/2023     Assessment and Plan:     Problem List Items Addressed This Visit        Endocrine    Diabetes mellitus, new onset ([address] )    Relevant Orders    IRIS Diabetic eye exam   Other Visit Diagnoses     Annual physical exam    -  Primary    Encounter for immunization        Relevant Orders    Pneumococcal Conjugate Vaccine 20-valent (Pcv20)          Immunizations and preventive care screenings were discussed with patient today  Appropriate education was printed on patient's after visit summary  Counseling:  Alcohol/drug use: discussed moderation in alcohol intake, the recommendations for healthy alcohol use, and avoidance of illicit drug use  Dental Health: discussed importance of regular tooth brushing, flossing, and dental visits  Injury prevention: discussed safety/seat belts, safety helmets, smoke detectors, carbon dioxide detectors, and smoking near bedding or upholstery  Sexual health: discussed sexually transmitted diseases, partner selection, use of condoms, avoidance of unintended pregnancy, and contraceptive alternatives  · Exercise: the importance of regular exercise/physical activity was discussed  Recommend exercise 3-5 times per week for at least 30 minutes  BMI Counseling: There is no height or weight on file to calculate BMI  The BMI is above normal  Nutrition recommendations include decreasing portion sizes, encouraging healthy choices of fruits and vegetables, decreasing fast food intake, consuming healthier snacks, limiting drinks that contain sugar, moderation in carbohydrate intake, increasing intake of lean protein, reducing intake of saturated and trans fat and reducing intake of cholesterol  Exercise recommendations include moderate physical activity 150 minutes/week   No pharmacotherapy was ordered  Rationale for BMI follow-up plan is due to patient being overweight or obese  Return in about 2 months (around 3/10/2023) for Recheck dm  Chief Complaint:     Chief Complaint   Patient presents with   • Physical Exam      History of Present Illness:     Adult Annual Physical   Patient here for a comprehensive physical exam  The patient reports no problems  Diet and Physical Activity  · Diet/Nutrition: well balanced diet  · Exercise: walking  Depression Screening  PHQ-2/9 Depression Screening         General Health  · Sleep: sleeps well  · Hearing: normal - bilateral   · Vision: most recent eye exam >1 year ago and wears glasses  · Dental: regular dental visits, brushes teeth twice daily and flosses teeth occasionally   Health  · Symptoms include: none     Review of Systems:     Review of Systems   Constitutional: Negative for chills and fever  HENT: Negative for ear pain and sore throat  Eyes: Negative for pain and visual disturbance  Respiratory: Negative for cough and shortness of breath  Cardiovascular: Negative for chest pain and palpitations  Gastrointestinal: Negative for abdominal pain and vomiting  Genitourinary: Negative for dysuria and hematuria  Musculoskeletal: Negative for arthralgias and back pain  Skin: Negative for color change and rash  Neurological: Negative for seizures and syncope  All other systems reviewed and are negative  Past Medical History:     Past Medical History:   Diagnosis Date   • Diabetes mellitus, new onset ([facility] [address] ) 1/3/2023   • Mixed hyperlipidemia 1/3/2023   • Primary hypertension 12/13/2022      Past Surgical History:     History reviewed  No pertinent surgical history  Family History:     History reviewed  No pertinent family history     Social History:     Social History     Socioeconomic History   • Marital status: /Civil Union     Spouse name: None   • Number of children: None   • Years of education: None   • Highest education level: None   Occupational History   • None   Tobacco Use   • Smoking status: Never   • Smokeless tobacco: Never   Substance and Sexual Activity   • Alcohol use: Not Currently   • Drug use: Never   • Sexual activity: None   Other Topics Concern   • None   Social History Narrative   • None     Social Determinants of Health     Financial Resource Strain: Low Risk    • Difficulty of Paying Living Expenses: Not very hard   Food Insecurity: No Food Insecurity   • Worried About Running Out of Food in the Last Year: Never true   • Ran Out of Food in the Last Year: Never true   Transportation Needs: No Transportation Needs   • Lack of Transportation (Medical): No   • Lack of Transportation (Non-Medical): No   Physical Activity: Not on file   Stress: Not on file   Social Connections: Not on file   Intimate Partner Violence: Not on file   Housing Stability: Not on file      Current Medications:     Current Outpatient Medications   Medication Sig Dispense Refill   • atorvastatin (LIPITOR) 40 mg tablet Take 1 tablet (40 mg total) by mouth every evening 90 tablet 1   • Blood Glucose Monitoring Suppl (OneTouch Verio Reflect) w/Device KIT Check blood sugars three times daily  Please substitute with appropriate alternative as covered by patient's insurance  Dx: E11 65 1 kit 0   • enalapril (VASOTEC) 20 mg tablet Take 1 tablet (20 mg total) by mouth daily 90 tablet 1   • glipiZIDE (GLUCOTROL) 5 mg tablet Take 1 tablet (5 mg total) by mouth daily with breakfast 90 tablet 1   • glucose blood (OneTouch Verio) test strip Check blood sugars three times daily  Please substitute with appropriate alternative as covered by patient's insurance  Dx: E11 65 300 each 3   • metFORMIN (GLUCOPHAGE) 1000 MG tablet Take 1 tablet (1,000 mg total) by mouth 2 (two) times a day with meals 90 tablet 1   • OneTouch Delica Lancets 05I MISC Check blood sugars three times daily   Please substitute with appropriate alternative as covered by patient's insurance  Dx: E11 65 300 each 3     No current facility-administered medications for this visit  Allergies:     No Known Allergies   Physical Exam:     /80 (BP Location: Right arm, Patient Position: Sitting, Cuff Size: Standard)   Pulse 75   Temp 98 7 °F (37 1 °C) (Temporal)   Resp 16   Ht 5' 7" (1 702 m)   Wt 90 3 kg (199 lb)   SpO2 96%   BMI 31 17 kg/m²     Physical Exam  Vitals and nursing note reviewed  Constitutional:       General: He is not in acute distress  Appearance: He is well-developed and overweight  HENT:      Head: Normocephalic and atraumatic  Right Ear: External ear normal       Left Ear: External ear normal       Nose: Nose normal    Eyes:      Conjunctiva/sclera: Conjunctivae normal    Cardiovascular:      Rate and Rhythm: Normal rate and regular rhythm  Pulses: Normal pulses  Heart sounds: Normal heart sounds  No murmur heard  Pulmonary:      Effort: Pulmonary effort is normal  No respiratory distress  Breath sounds: Normal breath sounds  Abdominal:      Palpations: Abdomen is soft  Tenderness: There is no abdominal tenderness  Musculoskeletal:         General: No swelling  Normal range of motion  Cervical back: Normal range of motion and neck supple  Skin:     General: Skin is warm and dry  Capillary Refill: Capillary refill takes less than 2 seconds  Neurological:      Mental Status: He is alert and oriented to person, place, and time  Mental status is at baseline  Psychiatric:         Mood and Affect: Mood normal          Behavior: Behavior normal          Thought Content:  Thought content normal          Judgment: Judgment normal           Guru Fuller, [address] [place] [address]

## 2023-01-11 LAB
LEFT EYE DIABETIC RETINOPATHY: NORMAL
LEFT EYE IMAGE QUALITY: NORMAL
LEFT EYE MACULAR EDEMA: NORMAL
LEFT EYE OTHER RETINOPATHY: NORMAL
RIGHT EYE DIABETIC RETINOPATHY: NORMAL
RIGHT EYE IMAGE QUALITY: NORMAL
RIGHT EYE MACULAR EDEMA: NORMAL
RIGHT EYE OTHER RETINOPATHY: NORMAL
SEVERITY (EYE EXAM): NORMAL

## 2023-01-27 ENCOUNTER — TELEPHONE (OUTPATIENT)
Dept: DIABETES SERVICES | Facility: OTHER | Age: 43
End: 2023-01-27

## 2023-02-03 ENCOUNTER — TELEPHONE (OUTPATIENT)
Dept: DIABETES SERVICES | Facility: OTHER | Age: 43
End: 2023-02-03

## 2023-02-06 ENCOUNTER — TELEPHONE (OUTPATIENT)
Dept: DIABETES SERVICES | Facility: OTHER | Age: 43
End: 2023-02-06

## 2023-02-06 NOTE — TELEPHONE ENCOUNTER
Called patient with regards to missed appointment today; he forgot    Rescheduled visit for 2/13 at 10 am

## 2023-02-13 ENCOUNTER — OFFICE VISIT (OUTPATIENT)
Dept: DIABETES SERVICES | Facility: OTHER | Age: 43
End: 2023-02-13

## 2023-02-13 DIAGNOSIS — E11.9 DIABETES MELLITUS, NEW ONSET (HCC): ICD-10-CM

## 2023-02-13 NOTE — PROGRESS NOTES
Type 2 Diabetes Class Assessment    HPI: Met with Yris Sparrow for DSME Initial visit  Hari Thomason has Type 2 Diabetes  Patient is Slovak speaking  This visit was performed with a Slovak speaking provider  He will return in one month for continued DSMES  Diabetes Assessment  Visit Type: Initial visit  Present at Session: patient   Medical Diagnosis/ICD 10: E11 9  Special Learning Needs: No  Barriers to Learning: no barriers    How do you feel about making lifestyle changes at this time? "I need help understanding which foods to avoid"  How would you rate your current knowledge of diabetes? fair  How confident are you that will be able to take better control of your diabetes?: good    How long have you had diabetes? Newly diagnosed  Have you had diabetes education in the past?: No  Do you have any family members with diabetes?: No  Do you monitor your blood sugar? yes  Type of blood sugar monitor: One Touch Verio  How old is your meter?: new  How often do you test your blood sugars?:once a day   Do you keep a written record of your blood sugars? No   Blood sugar log with patient today and reviewed by educator?: No   Blood Sugar ranges:    Fasting: does not check fasting    Before meals: does not check   2 hours after meals: he estimates it ranges from 110 to 140, only once did it ever go to 200, but he was able to associate it with food - asked him to start checking random fasting am sugar, keeping it once a day   Any financial concerns pertaining to your diabetes supplies, medication or care?: Yes  Have you ever experienced hypoglycemia?:  He's not sure, but he described a weird feeling that went away after eating a piece of fruit - educated on symptoms of hypoglycemia, when to check and proper treatment   Have you ever been hospitalized or gone to the ER due to your blood sugars?: No  How do you treat low blood sugars?: unsure, patient educated   How do you treat high blood sugars?  Unsure, patient educated   Do you wear a Diabetes I D ?: no  Where do you dispose of your sharps (needles,lancetes)?: jose angel, patient was educated     Ht Readings from Last 1 Encounters:   01/10/23 5' 7" (1 702 m)     Wt Readings from Last 1 Encounters:   01/10/23 90 3 kg (199 lb)       bmi   Weight Change: Yes weight loss    Diet Assessment    Based on diet recall, he is eating 3 meals a day, but not consistent carbs  He was educated on carb, protein and fat food sources using My Plate method  Also shown how to read label  Food Log: Completed via the method of food recall    Breakfast: He wakes up around 9 am, first meal is around 11 or 11:30, eats a 6-inch hoagie roll with ham and cheese, starting to use [employer] bread   Morning Snack:no snack   Lunch:3:30 - salad with grilled chicken,   Afternoon Snack: no snacks   Dinner: 9 pm - frijoles with 2 eggs, cucumbers    He goes to be around 1 am   Evening Snack: no evening snack   Beverages: eliminated sweetened beverages, only drinks water   Eating out/Take out:he works at [employer], so eats lunch     Do you follow any special diet presently?: Yes - cut back on soda   Who cooks at home?:  patient  Who does the grocery shopping?: patient    Activity Assessment    Exercise: nothing at this time     Lifestyle/Social Assessment    Racial/ethnic group:                                       Primary Language: Afghan  Marital Status:   Education Level: High School (No Diploma)   Work status: Full Time  Type of job and hours: 40 hours a week, 5 days a week  in a [employer]   Who lives in your household?: roommates   Who is you primary support person(s): nephew    Describe your quality of life currently?: good  Any concerns for your safety?: No  Any Congregation or cultural practices that may affect your diabetes care: No  Do you have a decrease or loss of hearing?: No  Do you have a decrease or loss of vision?: experienced blurry vision, but has seen cleared as his blood sugar is improving   When was the last time you had an ophthalmology exam?: 1/10/23   When was the last time you had dental exam?: nothing recent, could not recall last visit   Do you check your feet for cracks, sores, debris?: Yes  When was the last time you had podiatry or foot exam?: 1/3/23  Last flu shot?: not done   Pneumonia shot?: No      Lab Results   Component Value Date    HGBA1C 12 6 (H) 12/13/2022     Lab Results   Component Value Date    HDL 41 12/13/2022    LDLCALC 163 (H) 12/13/2022    TRIG 235 (H) 12/13/2022     No results found for: Yovanamonty Wynne      The patient's history was reviewed and updated as appropriate: allergies, current medications  Intervention    Diabetes Overview :   Fuentesluis Lilly was instructed on basic concepts of diabetes, including identifying role of diabetes self management, basic pathophysiology and types of diabetes, A1c and blood sugar targets  Alfredo Lilly has good understanding of material covered  Taking Medications: Instructed patient on action, side effects, efficacy, prescribed dosage and appropriate timing and frequency of administration of his diabetes medication  Alfredo Lilly has good understanding of material covered  Monitoring Blood Sugars    Testing frequency:  Test sugars before a meal and 2hr after the same meal, rotating between breakfast, lunch, and dinner  Test sugars twice a day (3 days a week, 7 days a week)  Goal Blood Sugars:   Premeal , even better <110  2hr after a meal <180, even better <140  A1C <7%, even better <6 5%  Hypoglycemia: Instructed patient on definition/risk of hypoglycemia, treatment, causes/symptoms, when to notify provider of lows, prevention of hypoglycemia and exercise precautions  Comments: Severiano verbalizes understanding of hypoglycemia concepts      Physical Activity: Discussed benefits of physical activity to optimize blood glucose control, encouraged activity at patient is physically able   Always consult a physician prior to starting an exercise program   Comments: Severiano verbalizes understanding of hypoglycemia concepts        Diabetes Education Record  Sherine Rizvi received the following handouts: Planning Healthy Meal, diabetic friendly snacks, Living Well with Diabetes class information  Information provided in [place] citizen  Patient response to instruction    Comprehensiongood  Motivationgood  Expected Compliancegood  Response to Teachback: 100%, demonstrated understanding    Begin Time: 9:45  End Time: 10:45  Referring Provider: JASWANT Varela    Thank you for referring your patient to [address] [place] [address], it was a pleasure working with them today  Please feel free to call with any questions or concerns      Colby Álvarez RDN   [address] [facility] [address] [phone]

## 2023-02-24 ENCOUNTER — TELEPHONE (OUTPATIENT)
Dept: DIABETES SERVICES | Facility: OTHER | Age: 43
End: 2023-02-24

## 2023-02-24 NOTE — TELEPHONE ENCOUNTER
Wandalee Eon called needing to reschedule his appointment due to work conflict    Scheduled 3/14 at 10 am

## 2023-03-14 ENCOUNTER — OFFICE VISIT (OUTPATIENT)
Dept: DIABETES SERVICES | Facility: OTHER | Age: 43
End: 2023-03-14

## 2023-03-14 DIAGNOSIS — E11.9 DIABETES MELLITUS, NEW ONSET (HCC): Primary | ICD-10-CM

## 2023-03-14 NOTE — PROGRESS NOTES
Living Well with Diabetes Class #1    Severiano Orta attended the Living Well with Diabetes Class #1  Patient is Surinamese speaking  This visit was performed with a Surinamese speaking provider  Topics Covered in class today include: What is diabetes; Types of Diabetes; How Diabetes is diagnosed; Management skills; the role of exercise in blood sugar managements, Home glucose monitoring, and target ranges  Rochelle Borja participated in activities    The patient's history was reviewed and updated as appropriate: allergies, current medications  Lab Results   Component Value Date    HGBA1C 12 6 (H) 12/13/2022       Diabetes Education Record  Rochelle Borja was provided Living Well with Diabetes Class #1 book  4 steps to manage your diabetes for life and the 15/15 rule, information provided in Surinamese  Patient response to instruction    Comprehensiongood  Motivationgood  Expected Compliancegood    Begin Time:  10:10  End Time: 11:10  Referring Provider: JASWANT Stein    Thank you for referring your patient to [address], it was a pleasure working with them today  Please feel free to call with any questions or concerns      Jeff Sanchez RDN  [address] [facility] [address] [phone]

## 2023-03-21 ENCOUNTER — OFFICE VISIT (OUTPATIENT)
Dept: FAMILY MEDICINE CLINIC | Facility: CLINIC | Age: 43
End: 2023-03-21

## 2023-03-21 VITALS
DIASTOLIC BLOOD PRESSURE: 66 MMHG | SYSTOLIC BLOOD PRESSURE: 114 MMHG | OXYGEN SATURATION: 96 % | TEMPERATURE: 97.8 F | RESPIRATION RATE: 18 BRPM | HEIGHT: 67 IN | BODY MASS INDEX: 31.04 KG/M2 | HEART RATE: 57 BPM | WEIGHT: 197.8 LBS

## 2023-03-21 DIAGNOSIS — E78.2 MIXED HYPERLIPIDEMIA: ICD-10-CM

## 2023-03-21 DIAGNOSIS — E66.9 OBESITY (BMI 30-39.9): ICD-10-CM

## 2023-03-21 DIAGNOSIS — E11.9 CONTROLLED TYPE 2 DIABETES MELLITUS WITHOUT COMPLICATION, WITHOUT LONG-TERM CURRENT USE OF INSULIN (HCC): Primary | ICD-10-CM

## 2023-03-21 DIAGNOSIS — I10 PRIMARY HYPERTENSION: ICD-10-CM

## 2023-03-21 LAB — SL AMB POCT HEMOGLOBIN AIC: 7.5 (ref ?–6.5)

## 2023-03-21 NOTE — ASSESSMENT & PLAN NOTE
BP Readings from Last 3 Encounters:   03/21/23 114/66   01/10/23 130/80   01/03/23 128/80   -Stable  - Reports compliance with medications   -Continue enalapril 20 mg daily  -reviewed eating a low salt diet (such as the DASH diet), limiting alcohol, exercising, and wt loss

## 2023-03-21 NOTE — PROGRESS NOTES
Name: Aylin Pitts      : 1980      MRN: [id number]  Encounter Provider: JASWANT Sweet  Encounter Date: 3/21/2023   Encounter department: [address] [place] [address]     1  Controlled type 2 diabetes mellitus without complication, without long-term current use of insulin ([facility])  Assessment & Plan:    Lab Results   Component Value Date    HGBA1C 7 5 (A) 2023   - Significant improvement from previous a1c of 12 6, three months prior   -Reports compliance with medications  -Continue glipizide 5 mg and metformin 1000 mg BID  -Continue checking blood sugar before meals    -Discussed ssx of hypoglycemia  -Encourage following low carbohydrate diet, physical exercises and losing weight    -Inform to obtain HA1C before next visit    Orders:  -     POCT hemoglobin A1c    2  Primary hypertension  Assessment & Plan:  BP Readings from Last 3 Encounters:   23 114/66   01/10/23 130/80   23 128/80   -Stable  - Reports compliance with medications   -Continue enalapril 20 mg daily  -reviewed eating a low salt diet (such as the DASH diet), limiting alcohol, exercising, and wt loss  3  Obesity (BMI 30-39  9)  Assessment & Plan: Body mass index is 30 98 kg/m²  The BMI is above normal  Nutrition recommendations include reducing portion sizes, decreasing overall calorie intake, 3-5 servings of fruits/vegetables daily, reducing fast food intake, consuming healthier snacks, decreasing soda and/or juice intake, moderation in carbohydrate intake, increasing intake of lean protein, reducing intake of saturated fat and trans fat and reducing intake of cholesterol  Exercise recommendations include moderate aerobic physical activity for 150 minutes/week          4  Mixed hyperlipidemia  Assessment & Plan:  Lab Results   Component Value Date    CHOLESTEROL 251 (H) 2022     Lab Results   Component Value Date    HDL 41 2022     Lab Results Component Value Date    TRIG 235 (H) 12/13/2022     No results found for: [facility]  Lab Results   Component Value Date    LDLCALC 163 (H) 12/13/2022   - Check lipid panel prior to next OV  Continue Lipitor 40 mg daily             Raquel Soliman is a 37 y o  male who presents today for DM follow up  Patient presents for follow up of diabetes  Patient reports compliance with metformin 1000 mg BID and glipizide 5 mg daily  Patient reports occasionally having tremors when he has not eaten and it is resolved when patient eats  Patient denies  nausea, paresthesia of the feet, polydipsia, polyuria, visual disturbances, vomiting and weight loss  Evaluation to date has included: POCT A1C  Home sugars: BGs range between 100 and 140  Review of Systems   Constitutional: Negative for chills and fever  HENT: Negative for ear pain and sore throat  Eyes: Negative for pain and visual disturbance  Respiratory: Negative for cough and shortness of breath  Cardiovascular: Negative for chest pain and palpitations  Gastrointestinal: Negative for abdominal pain and vomiting  Genitourinary: Negative for dysuria and hematuria  Musculoskeletal: Negative for arthralgias and back pain  Skin: Negative for color change and rash  Neurological: Negative for seizures and syncope  Psychiatric/Behavioral: Negative  All other systems reviewed and are negative  Current Outpatient Medications on File Prior to Visit   Medication Sig   • atorvastatin (LIPITOR) 40 mg tablet Take 1 tablet (40 mg total) by mouth every evening   • Blood Glucose Monitoring Suppl (OneTouch Verio Reflect) w/Device KIT Check blood sugars three times daily  Please substitute with appropriate alternative as covered by patient's insurance   Dx: E11 65   • enalapril (VASOTEC) 20 mg tablet Take 1 tablet (20 mg total) by mouth daily   • glipiZIDE (GLUCOTROL) 5 mg tablet Take 1 tablet (5 mg total) by mouth daily with breakfast • glucose blood (OneTouch Verio) test strip Check blood sugars three times daily  Please substitute with appropriate alternative as covered by patient's insurance  Dx: E11 65   • metFORMIN (GLUCOPHAGE) 1000 MG tablet Take 1 tablet (1,000 mg total) by mouth 2 (two) times a day with meals   • OneTouch Delica Lancets 96V MISC Check blood sugars three times daily  Please substitute with appropriate alternative as covered by patient's insurance  Dx: E11 65       Objective     /66 (BP Location: Right arm, Patient Position: Sitting, Cuff Size: Standard)   Pulse 57   Temp 97 8 °F (36 6 °C) (Temporal)   Resp 18   Ht 5' 7" (1 702 m)   Wt 89 7 kg (197 lb 12 8 oz)   SpO2 96%   BMI 30 98 kg/m²     Physical Exam  Constitutional:       Appearance: Normal appearance  He is normal weight  HENT:      Head: Normocephalic  Right Ear: Tympanic membrane, ear canal and external ear normal       Left Ear: Tympanic membrane, ear canal and external ear normal       Nose: Nose normal       Mouth/Throat:      Mouth: Mucous membranes are moist    Eyes:      General:         Right eye: No discharge  Left eye: No discharge  Cardiovascular:      Rate and Rhythm: Normal rate and regular rhythm  Pulses: Normal pulses  Heart sounds: Normal heart sounds  Pulmonary:      Effort: Pulmonary effort is normal       Breath sounds: Normal breath sounds  Abdominal:      General: Abdomen is flat  Bowel sounds are normal       Palpations: Abdomen is soft  Musculoskeletal:         General: Normal range of motion  Cervical back: Normal range of motion  Right lower leg: No edema  Left lower leg: No edema  Skin:     General: Skin is warm and dry  Neurological:      General: No focal deficit present  Mental Status: He is alert and oriented to person, place, and time  Psychiatric:         Mood and Affect: Mood normal          Behavior: Behavior normal          Thought Content:  Thought content normal          Judgment: Judgment normal        Fredrick Crystal

## 2023-03-21 NOTE — ASSESSMENT & PLAN NOTE
Body mass index is 30 98 kg/m²  The BMI is above normal  Nutrition recommendations include reducing portion sizes, decreasing overall calorie intake, 3-5 servings of fruits/vegetables daily, reducing fast food intake, consuming healthier snacks, decreasing soda and/or juice intake, moderation in carbohydrate intake, increasing intake of lean protein, reducing intake of saturated fat and trans fat and reducing intake of cholesterol  Exercise recommendations include moderate aerobic physical activity for 150 minutes/week

## 2023-03-21 NOTE — ASSESSMENT & PLAN NOTE
Lab Results   Component Value Date    HGBA1C 7 5 (A) 03/21/2023   - Significant improvement from previous a1c of 12 6, three months prior   -Reports compliance with medications  -Continue glipizide 5 mg and metformin 1000 mg BID  -Continue checking blood sugar before meals    -Discussed ssx of hypoglycemia  -Encourage following low carbohydrate diet, physical exercises and losing weight    -Inform to obtain HA1C before next visit

## 2023-03-21 NOTE — ASSESSMENT & PLAN NOTE
Lab Results   Component Value Date    CHOLESTEROL 251 (H) 12/13/2022     Lab Results   Component Value Date    HDL 41 12/13/2022     Lab Results   Component Value Date    TRIG 235 (H) 12/13/2022     No results found for: Toni  Lab Results   Component Value Date    LDLCALC 163 (H) 12/13/2022   - Check lipid panel prior to next OV   Continue Lipitor 40 mg daily PT DAILY TREATMENT NOTE - [facility] -15    Patient Name: Doreen Leigh  Date:2021  : 1961  [x]  Patient  Verified  Payor: BLUE CROSS MEDICARE / Plan: [facility]  HMO / Product Type: Managed Care Medicare /    In time:930 am  Out time:1021  Total Treatment Time (min): 51  Total Timed Codes (min): 51  1:1 Treatment Time ( [address] only): 21   Visit #:  4    Treatment Area: Repeated falls [R29.6]    SUBJECTIVE  Pain Level (0-10 scale): 5/10  Any medication changes, allergies to medications, adverse drug reactions, diagnosis change, or new procedure performed?: [x] No    [] Yes (see summary sheet for update)  Subjective functional status/changes:   [] No changes reported  \"Pt reports sore soreness in back and legs. \"    OBJECTIVE    21 min Therapeutic Exercise:  [] See flow sheet :   Rationale: increase ROM, increase strength and improve coordination to improve the patients ability to increase functional motion in leg and reduce fall risk    *          30 min Group Therapy:  [] See flow sheet :   Billed while completing peer interaction during session with therapist    With   [] TE   [] TA   [] neuro   [] other: Patient Education: [x] Review HEP    [] Progressed/Changed HEP based on:   [] positioning   [] body mechanics   [] transfers   [] heat/ice application    [] other:          Pain Level (0-10 scale) post treatment: 5/10    ASSESSMENT/Changes in Function:   The pt tolerated treatment session with addition on stepping task to work on control and balance. Pt has some trouble with coordinating motion can not perform half squat in bars (no knee flex with task) but he can perform sit to stand ex from chair. Will work on tandem walk next session to increase functional knee flex with stepping.     Patient will continue to benefit from skilled PT services to modify and progress therapeutic interventions, address functional mobility deficits, address ROM deficits, address strength deficits, analyze and address soft tissue restrictions and analyze and cue movement patterns to attain remaining goals     [x]  See Plan of Care  []  See progress note/recertification  []  See Discharge Summary         Progress towards goals / Updated goals:   Short Term Goals: To be accomplished in 5 treatments. Patient will be able to properly demonstrate exercises properly for LE x 20 reps. Patient will be able to ambulate safely with rollator for community distances over 500 feet to get to the car. Patient will be able to perform sit to stand independently from low chairs with 1 hand support. Patient will be able to perform TUG Test less than 20 seconds to decrease the risk of falls.     Long Term Goals: To be accomplished in 10  treatments. Patient will be able to perform sit to stand independently from low chairs with 0 hand support. Patient will be able to climb up and down 4 steps x 3 min with 1 rail with LE strength of 4/5. Patient will be able to ambulate safely with assistive device for community distance for at 3 minutes to get on the scooter to shop [employer].   Patient will be able to perform TUG Test less than 15  seconds to decrease the risk of falls    PLAN  [x]  Upgrade activities as tolerated     [x]  Continue plan of care  []  Update interventions per flow sheet       []  Discharge due to:_  []  Other:_      Martir Wilson PTA, LPTA 12/7/2021

## 2023-03-28 ENCOUNTER — OFFICE VISIT (OUTPATIENT)
Dept: DIABETES SERVICES | Facility: OTHER | Age: 43
End: 2023-03-28

## 2023-03-28 DIAGNOSIS — E11.9 DIABETES MELLITUS, NEW ONSET (HCC): Primary | ICD-10-CM

## 2023-03-28 NOTE — PROGRESS NOTES
Living Well with Diabetes Class #2    Severiano Marivel attended the Living Well with Diabetes Class #2  During class, Suniljing Beckwith was instructed on the following topics: Macronutrients, Carbohydrate sources, What one serving of carbohydrate equals, effects of diet on blood glucose levels, effect of carbohydrates on blood glucose levels, basics of meal planning: balance, portions, meal times, measurements, reading food labels to determine carbohydrates  Anahi Beckwith participated in group activities of reading labels together and completing the meal planning activity  Anahi Beckwith will follow up with class #3  Will call with any questions or concerns prior to next session  The patient's history was reviewed and updated as appropriate: allergies, current medications  Lab Results   Component Value Date    HGBA1C 7 5 (A) 03/21/2023       Diabetes Education Record  Suniljing Amena was provided Exchange booklet and carbohydrate nutrition label  Information provided in Somali  Patient response to instruction    Comprehensiongood  Motivationgood  Expected Compliancegood    Begin Time: 10:00  End Time: 11:00  Referring Provider: JASWANT Spears    Thank you for referring your patient to [address] [place] [address], it was a pleasure working with them today  Please feel free to call with any questions or concerns      Jacqueline Dickey RDN   [address] [facility] [address] [phone]

## 2023-04-25 ENCOUNTER — OFFICE VISIT (OUTPATIENT)
Dept: DIABETES SERVICES | Facility: OTHER | Age: 43
End: 2023-04-25

## 2023-04-25 DIAGNOSIS — E11.9 DIABETES MELLITUS, NEW ONSET (HCC): Primary | ICD-10-CM

## 2023-04-25 NOTE — PROGRESS NOTES
Living Well with Diabetes Class #4    Severiano Orta attended the Living Well with Diabetes Class #4  During class, Larry Napier was instructed on the following topics: Types of cholesterol, dietary sources of cholesterol, types of fat, types of fiber, reading food labels- in depth, healthy choices when dining out  Larry Napier participated in activities of reading labels together and completed the dining out activity  Larry Napier will follow up with class #5 or additional DSMT/MNT as desired  Will call with any questions or concerns prior to next session  The patient's history was reviewed and updated as appropriate: allergies, current medications  Lab Results   Component Value Date    HGBA1C 7 5 (A) 03/21/2023       Diabetes Education Record  Larry Napier was provided Living Well with Diabetes Class #4 book      Patient response to instruction    Comprehensiongood  Motivationgood  Expected Compliancegood    Begin Time: 10:00  End Time: 11:00  Referring Provider: JASWANT Thompson    Thank you for referring your patient to [address] [place] [address], it was a pleasure working with them today  Please feel free to call with any questions or concerns      Cj Chambers RDN  [address] [facility] [address] [phone]

## 2023-04-25 NOTE — Clinical Note
Bradley Llanes has completed all 4 DSMES classes  He states blood sugars are ranging 130 fasting, but is able to make the connection why it may be higher on certain days  I will see him in follow-up/as needed   Thank you again for your refreral

## 2023-06-20 ENCOUNTER — OFFICE VISIT (OUTPATIENT)
Dept: FAMILY MEDICINE CLINIC | Facility: CLINIC | Age: 43
End: 2023-06-20

## 2023-06-20 VITALS
SYSTOLIC BLOOD PRESSURE: 130 MMHG | HEART RATE: 67 BPM | WEIGHT: 200 LBS | BODY MASS INDEX: 31.39 KG/M2 | RESPIRATION RATE: 16 BRPM | OXYGEN SATURATION: 97 % | TEMPERATURE: 97.2 F | HEIGHT: 67 IN | DIASTOLIC BLOOD PRESSURE: 70 MMHG

## 2023-06-20 DIAGNOSIS — E66.9 OBESITY (BMI 30-39.9): ICD-10-CM

## 2023-06-20 DIAGNOSIS — E11.9 CONTROLLED TYPE 2 DIABETES MELLITUS WITHOUT COMPLICATION, WITHOUT LONG-TERM CURRENT USE OF INSULIN (HCC): Primary | ICD-10-CM

## 2023-06-20 DIAGNOSIS — E78.2 MIXED HYPERLIPIDEMIA: ICD-10-CM

## 2023-06-20 DIAGNOSIS — I10 PRIMARY HYPERTENSION: ICD-10-CM

## 2023-06-20 LAB — SL AMB POCT HEMOGLOBIN AIC: 7 (ref ?–6.5)

## 2023-06-20 PROCEDURE — 99214 OFFICE O/P EST MOD 30 MIN: CPT

## 2023-06-20 PROCEDURE — 83036 HEMOGLOBIN GLYCOSYLATED A1C: CPT

## 2023-06-20 RX ORDER — GLIPIZIDE 5 MG/1
5 TABLET ORAL
Qty: 90 TABLET | Refills: 1 | Status: SHIPPED | OUTPATIENT
Start: 2023-06-20

## 2023-06-20 RX ORDER — ENALAPRIL MALEATE 20 MG/1
20 TABLET ORAL DAILY
Qty: 90 TABLET | Refills: 1 | Status: SHIPPED | OUTPATIENT
Start: 2023-06-20

## 2023-06-20 RX ORDER — ATORVASTATIN CALCIUM 40 MG/1
40 TABLET, FILM COATED ORAL EVERY EVENING
Qty: 90 TABLET | Refills: 1 | Status: SHIPPED | OUTPATIENT
Start: 2023-06-20 | End: 2023-12-17

## 2023-06-20 NOTE — ASSESSMENT & PLAN NOTE
BMI Counseling: Body mass index is 31 32 kg/m²  The BMI is above normal  Nutrition recommendations include reducing portion sizes, decreasing overall calorie intake, 3-5 servings of fruits/vegetables daily, reducing fast food intake, consuming healthier snacks, decreasing soda and/or juice intake, moderation in carbohydrate intake, increasing intake of lean protein, reducing intake of saturated fat and trans fat and reducing intake of cholesterol  Exercise recommendations include moderate aerobic physical activity for 150 minutes/week

## 2023-06-20 NOTE — PROGRESS NOTES
Name: Tiera Santiago      : 1980      MRN: [id number]  Encounter Provider: JASWANT Comer  Encounter Date: 2023   Encounter department: [address] [place] [address]     1  Controlled type 2 diabetes mellitus without complication, without long-term current use of insulin ([facility])  Assessment & Plan:  Lab Results   Component Value Date    HGBA1C 7 0 (A) 2023     - At goal  Continue Metformin 1000 mg BID & Glipizide 5 mg daily    Orders:  -     metFORMIN (GLUCOPHAGE) 1000 MG tablet; Take 1 tablet (1,000 mg total) by mouth 2 (two) times a day with meals  -     atorvastatin (LIPITOR) 40 mg tablet; Take 1 tablet (40 mg total) by mouth every evening  -     glipiZIDE (GLUCOTROL) 5 mg tablet; Take 1 tablet (5 mg total) by mouth daily with breakfast  -     POCT hemoglobin A1c    2  Primary hypertension  Assessment & Plan:  BP Readings from Last 3 Encounters:   23 130/70   23 114/66   01/10/23 130/80     - At goal  Continue vasotec 20 mg daily  -reviewed eating a low salt diet (such as the DASH diet), limiting alcohol, exercising, and wt loss  Orders:  -     enalapril (VASOTEC) 20 mg tablet; Take 1 tablet (20 mg total) by mouth daily    3  Obesity (BMI 30-39  9)  Assessment & Plan:  BMI Counseling: Body mass index is 31 32 kg/m²  The BMI is above normal  Nutrition recommendations include reducing portion sizes, decreasing overall calorie intake, 3-5 servings of fruits/vegetables daily, reducing fast food intake, consuming healthier snacks, decreasing soda and/or juice intake, moderation in carbohydrate intake, increasing intake of lean protein, reducing intake of saturated fat and trans fat and reducing intake of cholesterol  Exercise recommendations include moderate aerobic physical activity for 150 minutes/week          4  Mixed hyperlipidemia  Assessment & Plan:  Lab Results   Component Value Date    CHOLESTEROL 251 (H) 2022 "    Lab Results   Component Value Date    HDL 41 12/13/2022     Lab Results   Component Value Date    TRIG 235 (H) 12/13/2022     No results found for: \"NONHDLC\"  Lab Results   Component Value Date    LDLCALC 163 (H) 12/13/2022     - Check lipid panel  Continue Lipitor 40 mg daily    - Dietary counseling provided  Orders:  -     atorvastatin (LIPITOR) 40 mg tablet; Take 1 tablet (40 mg total) by mouth every evening         Raquel Evangelista is a 37 y o  male  has a past medical history of Diabetes mellitus, new onset ([address] ), Mixed hyperlipidemia, and Primary hypertension  has no past surgical history on file  Diabetes Mellitus  Patient presents for follow up of diabetes  Current symptoms include: none  Patient denies foot ulcerations, hyperglycemia, hypoglycemia , increased appetite, nausea, paresthesia of the feet, polydipsia, polyuria, visual disturbances, vomiting and weight loss  Evaluation to date has included: fasting blood sugar, fasting lipid panel, hemoglobin A1C and microalbuminuria  Home sugars: BGs consistently in an acceptable range  Current treatment: compliance with current medications  Review of Systems   Constitutional: Negative for chills and fever  HENT: Negative for ear pain and sore throat  Eyes: Negative for pain and visual disturbance  Respiratory: Negative for cough and shortness of breath  Cardiovascular: Negative for chest pain and palpitations  Gastrointestinal: Negative for abdominal pain and vomiting  Genitourinary: Negative for dysuria and hematuria  Musculoskeletal: Negative for arthralgias and back pain  Skin: Negative for color change and rash  Neurological: Negative for seizures and syncope  All other systems reviewed and are negative  Current Outpatient Medications on File Prior to Visit   Medication Sig   • Blood Glucose Monitoring Suppl (OneTouch Verio Reflect) w/Device KIT Check blood sugars three times daily   Please " "substitute with appropriate alternative as covered by patient's insurance  Dx: E11 65   • glucose blood (OneTouch Verio) test strip Check blood sugars three times daily  Please substitute with appropriate alternative as covered by patient's insurance  Dx: K94 05   • OneTouch Delica Lancets 39V MISC Check blood sugars three times daily  Please substitute with appropriate alternative as covered by patient's insurance  Dx: E11 65   • [DISCONTINUED] atorvastatin (LIPITOR) 40 mg tablet Take 1 tablet (40 mg total) by mouth every evening   • [DISCONTINUED] enalapril (VASOTEC) 20 mg tablet Take 1 tablet (20 mg total) by mouth daily   • [DISCONTINUED] glipiZIDE (GLUCOTROL) 5 mg tablet Take 1 tablet by mouth once daily with breakfast   • [DISCONTINUED] metFORMIN (GLUCOPHAGE) 1000 MG tablet TAKE 1 TABLET BY MOUTH TWICE DAILY WITH MEALS       Objective     /70 (BP Location: Right arm, Patient Position: Sitting, Cuff Size: Standard)   Pulse 67   Temp (!) 97 2 °F (36 2 °C) (Temporal)   Resp 16   Ht 5' 7\" (1 702 m)   Wt 90 7 kg (200 lb)   SpO2 97%   BMI 31 32 kg/m²     Physical Exam  Vitals and nursing note reviewed  Constitutional:       General: He is not in acute distress  Appearance: He is obese  He is not ill-appearing  HENT:      Head: Normocephalic and atraumatic  Right Ear: External ear normal  There is no impacted cerumen  Left Ear: External ear normal  There is no impacted cerumen  Nose: Nose normal  No congestion  Mouth/Throat:      Mouth: Mucous membranes are moist       Pharynx: Oropharynx is clear  Eyes:      Extraocular Movements: Extraocular movements intact  Conjunctiva/sclera: Conjunctivae normal       Pupils: Pupils are equal, round, and reactive to light  Cardiovascular:      Rate and Rhythm: Normal rate and regular rhythm  Pulses: Normal pulses  Heart sounds: Normal heart sounds  No murmur heard    Pulmonary:      Effort: Pulmonary effort is normal     " Breath sounds: Normal breath sounds  Abdominal:      General: Bowel sounds are normal       Palpations: Abdomen is soft  Tenderness: There is no abdominal tenderness  Musculoskeletal:         General: No tenderness  Normal range of motion  Cervical back: Normal range of motion  No tenderness  Skin:     General: Skin is warm and dry  Neurological:      General: No focal deficit present  Mental Status: He is alert and oriented to person, place, and time  Psychiatric:         Mood and Affect: Mood normal          Behavior: Behavior normal          Thought Content:  Thought content normal          Judgment: Judgment normal        Consuelo Scarlet

## 2023-06-20 NOTE — ASSESSMENT & PLAN NOTE
BP Readings from Last 3 Encounters:   06/20/23 130/70   03/21/23 114/66   01/10/23 130/80     - At goal  Continue vasotec 20 mg daily  -reviewed eating a low salt diet (such as the DASH diet), limiting alcohol, exercising, and wt loss

## 2023-06-20 NOTE — ASSESSMENT & PLAN NOTE
Lab Results   Component Value Date    HGBA1C 7 0 (A) 06/20/2023     - At goal  Continue Metformin 1000 mg BID & Glipizide 5 mg daily

## 2023-06-20 NOTE — ASSESSMENT & PLAN NOTE
"Lab Results   Component Value Date    CHOLESTEROL 251 (H) 12/13/2022     Lab Results   Component Value Date    HDL 41 12/13/2022     Lab Results   Component Value Date    TRIG 235 (H) 12/13/2022     No results found for: \"NONHDLC\"  Lab Results   Component Value Date    LDLCALC 163 (H) 12/13/2022     - Check lipid panel  Continue Lipitor 40 mg daily    - Dietary counseling provided       "

## 2023-09-12 ENCOUNTER — TELEPHONE (OUTPATIENT)
Dept: FAMILY MEDICINE CLINIC | Facility: CLINIC | Age: 43
End: 2023-09-12

## 2023-09-12 DIAGNOSIS — E11.9 CONTROLLED TYPE 2 DIABETES MELLITUS WITHOUT COMPLICATION, WITHOUT LONG-TERM CURRENT USE OF INSULIN (HCC): ICD-10-CM

## 2023-09-12 DIAGNOSIS — I10 PRIMARY HYPERTENSION: ICD-10-CM

## 2023-09-12 DIAGNOSIS — E78.2 MIXED HYPERLIPIDEMIA: ICD-10-CM

## 2023-09-12 RX ORDER — GLIPIZIDE 5 MG/1
5 TABLET ORAL
Qty: 90 TABLET | Refills: 2 | Status: SHIPPED | OUTPATIENT
Start: 2023-09-12

## 2023-09-12 RX ORDER — ENALAPRIL MALEATE 20 MG/1
20 TABLET ORAL DAILY
Qty: 90 TABLET | Refills: 1 | Status: SHIPPED | OUTPATIENT
Start: 2023-09-12

## 2023-09-12 RX ORDER — ATORVASTATIN CALCIUM 40 MG/1
40 TABLET, FILM COATED ORAL EVERY EVENING
Qty: 90 TABLET | Refills: 1 | Status: SHIPPED | OUTPATIENT
Start: 2023-09-12 | End: 2024-03-10

## 2023-09-12 NOTE — TELEPHONE ENCOUNTER
Patient came in and requested refill of following medication    metFORMIN (GLUCOPHAGE) 1000 MG tablet

## 2024-04-23 ENCOUNTER — OFFICE VISIT (OUTPATIENT)
Dept: DENTISTRY | Facility: CLINIC | Age: 44
End: 2024-04-23

## 2024-04-23 VITALS — TEMPERATURE: 99 F | HEART RATE: 58 BPM | DIASTOLIC BLOOD PRESSURE: 95 MMHG | SYSTOLIC BLOOD PRESSURE: 155 MMHG

## 2024-04-23 DIAGNOSIS — Z01.20 ENCOUNTER FOR DENTAL EXAMINATION: Primary | ICD-10-CM

## 2024-04-23 PROBLEM — E27.9 ADRENAL NODULE (HCC): Status: ACTIVE | Noted: 2024-03-18

## 2024-04-23 PROBLEM — K21.9 GERD (GASTROESOPHAGEAL REFLUX DISEASE): Status: ACTIVE | Noted: 2024-03-20

## 2024-04-23 PROBLEM — R33.9 URINARY RETENTION: Status: ACTIVE | Noted: 2024-03-18

## 2024-04-23 PROBLEM — R10.9 ABDOMINAL PAIN: Status: ACTIVE | Noted: 2024-03-18

## 2024-04-23 PROBLEM — E27.8 ADRENAL NODULE (HCC): Status: ACTIVE | Noted: 2024-03-18

## 2024-04-23 PROCEDURE — D0220 INTRAORAL - PERIAPICAL FIRST RADIOGRAPHIC IMAGE: HCPCS

## 2024-04-23 PROCEDURE — D0330 PANORAMIC RADIOGRAPHIC IMAGE: HCPCS

## 2024-04-23 PROCEDURE — D0140 LIMITED ORAL EVALUATION - PROBLEM FOCUSED: HCPCS

## 2024-04-23 RX ORDER — OMEPRAZOLE 40 MG/1
40 CAPSULE, DELAYED RELEASE ORAL DAILY
COMMUNITY
Start: 2024-03-20 | End: 2025-03-20

## 2024-04-23 RX ORDER — IBUPROFEN 600 MG/1
600 TABLET ORAL EVERY 6 HOURS PRN
Qty: 28 TABLET | Refills: 0 | Status: SHIPPED | OUTPATIENT
Start: 2024-04-23 | End: 2024-04-30

## 2024-04-23 RX ORDER — ACETAMINOPHEN 500 MG
500 TABLET ORAL EVERY 6 HOURS PRN
Qty: 28 TABLET | Refills: 0 | Status: SHIPPED | OUTPATIENT
Start: 2024-04-23 | End: 2024-04-30

## 2024-04-23 NOTE — DENTAL PROCEDURE DETAILS
Subjective   Patient ID: Severiano Orta is a 44 y.o. male.  Chief Complaint   Patient presents with    Emergency/limited Exam     Reviewed medical history   ASA 1    New patient presents with pain UR  lost filling # 2  deep decay noted # 1,2,3,   pain 6/10 at times  Very sensitive to air and cold    [place] and pano taken  Generalized moderate to heavy calc noted    Needs DEBRIDEMENT followed by comp exam TX plan   will need BWX taken    EXAM  DR DAMON    Referred to OS for extraction of # 1 and # 2  gave x rays  Needs DEBRIDEMENT after extractions    will need bwx, comp exam and lexis charted    NV1  debridement ( after extr)  NV 2 comp exam bwx 4 weeks later  NV 3  lexis # 3, 14 and any other areas charted at comp

## 2024-04-30 ENCOUNTER — OFFICE VISIT (OUTPATIENT)
Dept: DENTISTRY | Facility: CLINIC | Age: 44
End: 2024-04-30

## 2024-04-30 VITALS — SYSTOLIC BLOOD PRESSURE: 131 MMHG | DIASTOLIC BLOOD PRESSURE: 85 MMHG | HEART RATE: 60 BPM

## 2024-04-30 DIAGNOSIS — Z01.20 ENCOUNTER FOR DENTAL EXAMINATION: Primary | ICD-10-CM

## 2024-04-30 PROCEDURE — D4355 FULL MOUTH DEBRIDEMENT TO ENABLE A COMPREHENSIVE ORAL EVALUATION AND DIAGNOSIS ON A SUBSEQUENT VISIT: HCPCS

## 2024-04-30 NOTE — PROGRESS NOTES
FULL MOUTH DEBRIDEMENT    Pain: 3 ( molar areas)   ASA:  II  Reviewed medical history     Full mouth debridement performed to remove plaque and calculus that interferes with the ability to perform a comprehensive oral evaluation.  Generalized moderate to heavy deposits noted supra and sub gingivally   Listerine used as pre and post procedural rinse.  Ultra sonic and hand instruments used to remove heavy deposits. Heavy bleeding ( facial calculus) patient never had his teeth cleaned     Reviewed home care with patient   Demonstrated brushing and flossing and interproximal cleaning techniques    Explained that patient will need to return for a post debridement re-evaluation and full mouth periodontal charting on a subsequent visit.   Patient may need to have root planing and scaling performed if indicated.  Stressed importance on continuation of care to regain oral health.    NV: 1 Comp exam 4BW & FMP   No exam today   
No

## 2024-07-02 ENCOUNTER — TELEPHONE (OUTPATIENT)
Dept: FAMILY MEDICINE CLINIC | Facility: CLINIC | Age: 44
End: 2024-07-02

## 2024-07-02 DIAGNOSIS — I10 PRIMARY HYPERTENSION: ICD-10-CM

## 2024-07-02 DIAGNOSIS — E11.9 CONTROLLED TYPE 2 DIABETES MELLITUS WITHOUT COMPLICATION, WITHOUT LONG-TERM CURRENT USE OF INSULIN (HCC): ICD-10-CM

## 2024-07-02 RX ORDER — ENALAPRIL MALEATE 20 MG/1
20 TABLET ORAL DAILY
Qty: 90 TABLET | Refills: 1 | Status: SHIPPED | OUTPATIENT
Start: 2024-07-02

## 2024-07-02 RX ORDER — GLIPIZIDE 5 MG/1
5 TABLET ORAL
Qty: 90 TABLET | Refills: 2 | Status: SHIPPED | OUTPATIENT
Start: 2024-07-02

## 2024-07-02 NOTE — TELEPHONE ENCOUNTER
Patient came into office today requesting medication refill:    glipiZIDE (GLUCOTROL) 5 mg tablet   enalapril (VASOTEC) 20 mg tablet     Pt has appt scheduled for 7/16/24.    Please advise. Thank you!

## 2024-07-16 ENCOUNTER — OFFICE VISIT (OUTPATIENT)
Dept: FAMILY MEDICINE CLINIC | Facility: CLINIC | Age: 44
End: 2024-07-16

## 2024-07-16 VITALS
SYSTOLIC BLOOD PRESSURE: 160 MMHG | HEIGHT: 67 IN | DIASTOLIC BLOOD PRESSURE: 94 MMHG | BODY MASS INDEX: 33.13 KG/M2 | OXYGEN SATURATION: 96 % | WEIGHT: 211.1 LBS | RESPIRATION RATE: 18 BRPM | TEMPERATURE: 98.4 F | HEART RATE: 71 BPM

## 2024-07-16 DIAGNOSIS — I10 PRIMARY HYPERTENSION: ICD-10-CM

## 2024-07-16 DIAGNOSIS — E11.9 CONTROLLED TYPE 2 DIABETES MELLITUS WITHOUT COMPLICATION, WITHOUT LONG-TERM CURRENT USE OF INSULIN (HCC): ICD-10-CM

## 2024-07-16 DIAGNOSIS — K21.9 GASTROESOPHAGEAL REFLUX DISEASE WITHOUT ESOPHAGITIS: ICD-10-CM

## 2024-07-16 DIAGNOSIS — E78.2 MIXED HYPERLIPIDEMIA: ICD-10-CM

## 2024-07-16 DIAGNOSIS — Z12.5 PROSTATE CANCER SCREENING: ICD-10-CM

## 2024-07-16 DIAGNOSIS — Z12.11 COLON CANCER SCREENING: ICD-10-CM

## 2024-07-16 DIAGNOSIS — Z00.00 ANNUAL PHYSICAL EXAM: Primary | ICD-10-CM

## 2024-07-16 PROBLEM — E27.9 ADRENAL NODULE (HCC): Status: RESOLVED | Noted: 2024-03-18 | Resolved: 2024-07-16

## 2024-07-16 PROBLEM — R10.9 ABDOMINAL PAIN: Status: RESOLVED | Noted: 2024-03-18 | Resolved: 2024-07-16

## 2024-07-16 PROBLEM — R33.9 URINARY RETENTION: Status: RESOLVED | Noted: 2024-03-18 | Resolved: 2024-07-16

## 2024-07-16 PROBLEM — M54.2 NECK PAIN: Status: RESOLVED | Noted: 2022-12-13 | Resolved: 2024-07-16

## 2024-07-16 PROBLEM — E27.8 ADRENAL NODULE (HCC): Status: RESOLVED | Noted: 2024-03-18 | Resolved: 2024-07-16

## 2024-07-16 PROCEDURE — 99396 PREV VISIT EST AGE 40-64: CPT

## 2024-07-16 PROCEDURE — 99214 OFFICE O/P EST MOD 30 MIN: CPT

## 2024-07-16 RX ORDER — OMEPRAZOLE 40 MG/1
40 CAPSULE, DELAYED RELEASE ORAL DAILY
Qty: 30 CAPSULE | Refills: 11 | Status: SHIPPED | OUTPATIENT
Start: 2024-07-16 | End: 2025-07-16

## 2024-07-16 NOTE — PROGRESS NOTES
"Adult Annual Physical  Name: Severiano Orta      : 1980      MRN: [id number]  Encounter Provider: JASWANT Tapia  Encounter Date: 2024   Encounter department: [facility]    Assessment & Plan   1. Annual physical exam  2. Controlled type 2 diabetes mellitus without complication, without long-term current use of insulin (HCC)  -     Diabetic foot exam; Future  -     metFORMIN (GLUCOPHAGE) 1000 MG tablet; Take 1 tablet (1,000 mg total) by mouth 2 (two) times a day with meals  -     Basic metabolic panel; Future  -     Hemoglobin A1C; Future  -     Albumin / creatinine urine ratio; Future  3. Primary hypertension  Assessment & Plan:  BP Readings from Last 3 Encounters:   24 160/94   24 131/85   24 155/95       - At goal. Continue vasotec 20 mg daily.   4. Mixed hyperlipidemia  Assessment & Plan:  Lab Results   Component Value Date    CHOLESTEROL 251 (H) 2022     Lab Results   Component Value Date    HDL 41 2022     Lab Results   Component Value Date    TRIG 235 (H) 2022     No results found for: \"NONHDLC\"  Lab Results   Component Value Date    LDLCALC 163 (H) 2022     - Check lipid panel. Continue Lipitor 40 mg daily.   5. Gastroesophageal reflux disease without esophagitis  -     omeprazole (PriLOSEC) 40 MG capsule; Take 1 capsule (40 mg total) by mouth daily  6. Prostate cancer screening  -     PSA, Total Screen; Future  7. Colon cancer screening  -     Ambulatory Referral to Gastroenterology; Future    Immunizations and preventive care screenings were discussed with patient today. Appropriate education was printed on patient's after visit summary.        Counseling:  Dental Health: discussed importance of regular tooth brushing, flossing, and dental visits.  Exercise: the importance of regular exercise/physical activity was discussed. Recommend exercise 3-5 times per week for at least 30 minutes.     BMI " Counseling: Body mass index is 33.06 kg/m². The BMI is above normal. Nutrition recommendations include decreasing portion sizes, encouraging healthy choices of fruits and vegetables, decreasing fast food intake, consuming healthier snacks, limiting drinks that contain sugar, moderation in carbohydrate intake, increasing intake of lean protein, reducing intake of saturated and trans fat and reducing intake of cholesterol. Exercise recommendations include moderate physical activity 150 minutes/week. No pharmacotherapy was ordered. Rationale for BMI follow-up plan is due to patient being overweight or obese.     Depression Screening and Follow-up Plan: Patient was screened for depression during today's encounter. They screened negative with a PHQ-2 score of 0.        History of Present Illness     Adult Annual Physical:  Patient presents for annual physical. Patient presents today for follow-up of chronic conditions.  Denies any acute complaints. Chronic conditions are stable unless otherwise noted.    .     Diet and Physical Activity:  - Diet/Nutrition: poor diet.  - Exercise: no formal exercise.    Depression Screening:  - PHQ-2 Score: 0    General Health:  - Sleep: 7-8 hours of sleep on average and sleeps well.  - Hearing: normal hearing bilateral ears.  - Vision: wears glasses and most recent eye exam > 1 year ago.  - Dental: no dental visits for > 1 year, does not brush teeth regularly, brushes teeth twice daily and does not floss.     Health:  - History of STDs: no.   - Urinary symptoms: none.     Review of Systems   Constitutional:  Negative for chills and fever.   HENT:  Negative for ear pain and sore throat.    Eyes:  Negative for pain and visual disturbance.   Respiratory:  Negative for cough and shortness of breath.    Cardiovascular:  Negative for chest pain and palpitations.   Gastrointestinal:  Negative for abdominal pain and vomiting.   Genitourinary:  Negative for dysuria and hematuria.  "  Musculoskeletal:  Negative for arthralgias and back pain.   Skin:  Negative for color change and rash.   Neurological:  Negative for seizures and syncope.   All other systems reviewed and are negative.        Objective     /94 (BP Location: Left arm, Patient Position: Sitting, Cuff Size: Standard) Comment: Manual  Pulse 71   Temp 98.4 °F (36.9 °C) (Temporal)   Resp 18   Ht 5' 7\" (1.702 m)   Wt 95.8 kg (211 lb 1.6 oz)   SpO2 96%   BMI 33.06 kg/m²     Physical Exam  Vitals and nursing note reviewed.   Constitutional:       General: He is not in acute distress.     Appearance: He is well-developed. He is obese.   HENT:      Head: Normocephalic and atraumatic.      Right Ear: External ear normal.      Left Ear: External ear normal.      Nose: Nose normal.   Eyes:      Conjunctiva/sclera: Conjunctivae normal.   Cardiovascular:      Rate and Rhythm: Normal rate and regular rhythm.      Pulses: Normal pulses. no weak pulses.           Dorsalis pedis pulses are 2+ on the right side and 2+ on the left side.        Posterior tibial pulses are 2+ on the right side and 2+ on the left side.      Heart sounds: Normal heart sounds. No murmur heard.  Pulmonary:      Effort: Pulmonary effort is normal. No respiratory distress.      Breath sounds: Normal breath sounds.   Abdominal:      General: Bowel sounds are normal.      Palpations: Abdomen is soft.      Tenderness: There is no abdominal tenderness.   Musculoskeletal:         General: No swelling. Normal range of motion.      Cervical back: Normal range of motion and neck supple.   Feet:      Right foot:      Skin integrity: No ulcer, skin breakdown, erythema, warmth, callus or dry skin.      Left foot:      Skin integrity: No ulcer, skin breakdown, erythema, warmth, callus or dry skin.   Skin:     General: Skin is warm and dry.      Capillary Refill: Capillary refill takes less than 2 seconds.   Neurological:      General: No focal deficit present.      Mental " Status: He is alert and oriented to person, place, and time. Mental status is at baseline.   Psychiatric:         Mood and Affect: Mood normal.         Behavior: Behavior normal.         Thought Content: Thought content normal.         Judgment: Judgment normal.     Patient's shoes and socks removed.    Right Foot/Ankle   Right Foot Inspection  Skin Exam: skin normal and skin intact. No dry skin, no warmth, no callus, no erythema, no maceration, no abnormal color, no pre-ulcer, no ulcer and no callus.     Toe Exam: ROM and strength within normal limits.     Sensory   Vibration: intact  Proprioception: intact  Monofilament testing: intact    Vascular  Capillary refills: < 3 seconds  The right DP pulse is 2+. The right PT pulse is 2+.     Left Foot/Ankle  Left Foot Inspection  Skin Exam: skin normal and skin intact. No dry skin, no warmth, no erythema, no maceration, normal color, no pre-ulcer, no ulcer and no callus.     Toe Exam: ROM and strength within normal limits.     Sensory   Vibration: intact  Proprioception: intact  Monofilament testing: intact    Vascular  Capillary refills: < 3 seconds  The left DP pulse is 2+. The left PT pulse is 2+.     Assign Risk Category  No deformity present  No loss of protective sensation  No weak pulses  Risk: 0  \

## 2024-07-16 NOTE — ASSESSMENT & PLAN NOTE
"Lab Results   Component Value Date    CHOLESTEROL 251 (H) 12/13/2022     Lab Results   Component Value Date    HDL 41 12/13/2022     Lab Results   Component Value Date    TRIG 235 (H) 12/13/2022     No results found for: \"NONHDLC\"  Lab Results   Component Value Date    LDLCALC 163 (H) 12/13/2022     - Check lipid panel. Continue Lipitor 40 mg daily.   "

## 2024-07-16 NOTE — PATIENT INSTRUCTIONS
"[phone]: colonoscopia  Patient Education     Routine physical for adults   The Basics   Written by the doctors and editors at [facility]   What is a physical? -- A physical is a routine visit, or \"check-up,\" with your doctor. You might also hear it called a \"wellness visit\" or \"preventive visit.\"  During each visit, the doctor will:   Ask about your physical and mental health   Ask about your habits, behaviors, and lifestyle   Do an exam   Give you vaccines if needed   Talk to you about any medicines you take   Give advice about your health   Answer your questions  Getting regular check-ups is an important part of taking care of your health. It can help your doctor find and treat any problems you have. But it's also important for preventing health problems.  A routine physical is different from a \"sick visit.\" A sick visit is when you see a doctor because of a health concern or problem. Since physicals are scheduled ahead of time, you can think about what you want to ask the doctor.  How often should I get a physical? -- It depends on your age and health. In general, for people age 21 years and older:   If you are younger than 50 years, you might be able to get a physical every 3 years.   If you are 50 years or older, your doctor might recommend a physical every year.  If you have an ongoing health condition, like diabetes or high blood pressure, your doctor will probably want to see you more often.  What happens during a physical? -- In general, each visit will include:   Physical exam - The doctor or nurse will check your height, weight, heart rate, and blood pressure. They will also look at your eyes and ears. They will ask about how you are feeling and whether you have any symptoms that bother you.   Medicines - It's a good idea to bring a list of all the medicines you take to each doctor visit. Your doctor will talk to you about your medicines and answer any questions. Tell them if you are having any side " "effects that bother you. You should also tell them if you are having trouble paying for any of your medicines.   Habits and behaviors - This includes:   Your diet   Your exercise habits   Whether you smoke, drink alcohol, or use drugs   Whether you are sexually active   Whether you feel safe at home  Your doctor will talk to you about things you can do to improve your health and lower your risk of health problems. They will also offer help and support. For example, if you want to quit smoking, they can give you advice and might prescribe medicines. If you want to improve your diet or get more physical activity, they can help you with this, too.   Lab tests, if needed - The tests you get will depend on your age and situation. For example, your doctor might want to check your:   Cholesterol   Blood sugar   Iron level   Vaccines - The recommended vaccines will depend on your age, health, and what vaccines you already had. Vaccines are very important because they can prevent certain serious or deadly infections.   Discussion of screening - \"Screening\" means checking for diseases or other health problems before they cause symptoms. Your doctor can recommend screening based on your age, risk, and preferences. This might include tests to check for:   Cancer, such as breast, prostate, cervical, ovarian, colorectal, prostate, lung, or skin cancer   Sexually transmitted infections, such as chlamydia and gonorrhea   Mental health conditions like depression and anxiety  Your doctor will talk to you about the different types of screening tests. They can help you decide which screenings to have. They can also explain what the results might mean.   Answering questions - The physical is a good time to ask the doctor or nurse questions about your health. If needed, they can refer you to other doctors or specialists, too.  Adults older than 65 years often need other care, too. As you get older, your doctor will talk to you " about:   How to prevent falling at home   Hearing or vision tests   Memory testing   How to take your medicines safely   Making sure that you have the help and support you need at home  All topics are updated as new evidence becomes available and our peer review process is complete.  This topic retrieved from [employer] on: May 02, 2024.  Topic 620708 Version 1.0  Release: 32.4.3 - C32.122  © 2024 UpToDate, Inc. and/or its affiliates. All rights reserved.  Consumer Information Use and Disclaimer   Disclaimer: This generalized information is a limited summary of diagnosis, treatment, and/or medication information. It is not meant to be comprehensive and should be used as a tool to help the user understand and/or assess potential diagnostic and treatment options. It does NOT include all information about conditions, treatments, medications, side effects, or risks that may apply to a specific patient. It is not intended to be medical advice or a substitute for the medical advice, diagnosis, or treatment of a health care provider based on the health care provider's examination and assessment of a patient's specific and unique circumstances. Patients must speak with a health care provider for complete information about their health, medical questions, and treatment options, including any risks or benefits regarding use of medications. This information does not endorse any treatments or medications as safe, effective, or approved for treating a specific patient. UpToDate, Inc. and its affiliates disclaim any warranty or liability relating to this information or the use thereof.The use of this information is governed by the Terms of Use, available at https://www.wolterskluwer.com/en/know/clinical-effectiveness-terms. 2024© UpToDate, Inc. and its affiliates and/or licensors. All rights reserved.  Copyright   © 2024 UpToDate, Inc. and/or its affiliates. All rights reserved.

## 2024-07-16 NOTE — ASSESSMENT & PLAN NOTE
BP Readings from Last 3 Encounters:   07/16/24 160/94   04/30/24 131/85   04/23/24 155/95       - At goal. Continue vasotec 20 mg daily.

## 2024-07-25 ENCOUNTER — TELEPHONE (OUTPATIENT)
Age: 44
End: 2024-07-25

## 2024-07-25 NOTE — TELEPHONE ENCOUNTER
07/25/24  Screened by: Hazel Quan    Referring Provider     Pre- Screening:     There is no height or weight on file to calculate BMI.  Has patient been referred for a routine screening Colonoscopy? yes  Is the patient between 45-75 years old? yes      Previous Colonoscopy no   If yes:    Date:     Facility:     Reason:         Does the patient want to see a Gastroenterologist prior to their procedure OR are they having any GI symptoms? no    Has the patient been hospitalized or had abdominal surgery in the past 6 months? no    Does the patient use supplemental oxygen? no    Does the patient take Coumadin, Lovenox, Plavix, Elliquis, Xarelto, or other blood thinning medication? no    Has the patient had a stroke, cardiac event, or stent placed in the past year? no      If patient is between 45yrs - 49yrs, please advise patient that we will have to confirm benefits & coverage with their insurance company for a routine screening colonoscopy.

## 2024-07-25 NOTE — TELEPHONE ENCOUNTER
Scheduled date of colonoscopy (as of today): 8/27/24  Physician performing colonoscopy: CLARA  Location of colonoscopy:   Bowel prep reviewed with patient: EDEN / DUL / DIABETIC  Instructions reviewed with patient by: EMAILED  Clearances: N/A

## 2024-08-26 RX ORDER — SODIUM CHLORIDE, SODIUM LACTATE, POTASSIUM CHLORIDE, CALCIUM CHLORIDE 600; 310; 30; 20 MG/100ML; MG/100ML; MG/100ML; MG/100ML
125 INJECTION, SOLUTION INTRAVENOUS CONTINUOUS
Status: CANCELLED | OUTPATIENT
Start: 2024-08-26

## 2024-08-27 ENCOUNTER — ANESTHESIA (OUTPATIENT)
Dept: GASTROENTEROLOGY | Facility: HOSPITAL | Age: 44
End: 2024-08-27

## 2024-08-27 ENCOUNTER — HOSPITAL ENCOUNTER (OUTPATIENT)
Dept: GASTROENTEROLOGY | Facility: HOSPITAL | Age: 44
Setting detail: OUTPATIENT SURGERY
Discharge: HOME | End: 2024-08-27
Attending: STUDENT IN AN ORGANIZED HEALTH CARE EDUCATION/TRAINING PROGRAM

## 2024-08-27 ENCOUNTER — ANESTHESIA EVENT (OUTPATIENT)
Dept: GASTROENTEROLOGY | Facility: HOSPITAL | Age: 44
End: 2024-08-27

## 2024-08-27 VITALS
DIASTOLIC BLOOD PRESSURE: 73 MMHG | OXYGEN SATURATION: 95 % | HEART RATE: 65 BPM | TEMPERATURE: 97 F | RESPIRATION RATE: 14 BRPM | SYSTOLIC BLOOD PRESSURE: 124 MMHG

## 2024-08-27 DIAGNOSIS — Z12.11 SCREENING FOR COLON CANCER: ICD-10-CM

## 2024-08-27 LAB — GLUCOSE SERPL-MCNC: 192 MG/DL (ref 65–140)

## 2024-08-27 PROCEDURE — 88305 TISSUE EXAM BY PATHOLOGIST: CPT | Performed by: PATHOLOGY

## 2024-08-27 PROCEDURE — 45385 COLONOSCOPY W/LESION REMOVAL: CPT | Performed by: INTERNAL MEDICINE

## 2024-08-27 PROCEDURE — 82948 REAGENT STRIP/BLOOD GLUCOSE: CPT

## 2024-08-27 RX ORDER — LIDOCAINE HYDROCHLORIDE 20 MG/ML
INJECTION, SOLUTION EPIDURAL; INFILTRATION; INTRACAUDAL; PERINEURAL AS NEEDED
Status: DISCONTINUED | OUTPATIENT
Start: 2024-08-27 | End: 2024-08-27

## 2024-08-27 RX ORDER — SODIUM CHLORIDE, SODIUM LACTATE, POTASSIUM CHLORIDE, CALCIUM CHLORIDE 600; 310; 30; 20 MG/100ML; MG/100ML; MG/100ML; MG/100ML
125 INJECTION, SOLUTION INTRAVENOUS CONTINUOUS
Status: DISCONTINUED | OUTPATIENT
Start: 2024-08-27 | End: 2024-08-31 | Stop reason: HOSPADM

## 2024-08-27 RX ORDER — PROPOFOL 10 MG/ML
INJECTION, EMULSION INTRAVENOUS AS NEEDED
Status: DISCONTINUED | OUTPATIENT
Start: 2024-08-27 | End: 2024-08-27

## 2024-08-27 RX ADMIN — PROPOFOL 20 MG: 10 INJECTION, EMULSION INTRAVENOUS at 07:52

## 2024-08-27 RX ADMIN — PROPOFOL 50 MG: 10 INJECTION, EMULSION INTRAVENOUS at 07:44

## 2024-08-27 RX ADMIN — PROPOFOL 20 MG: 10 INJECTION, EMULSION INTRAVENOUS at 07:37

## 2024-08-27 RX ADMIN — PROPOFOL 20 MG: 10 INJECTION, EMULSION INTRAVENOUS at 07:41

## 2024-08-27 RX ADMIN — PROPOFOL 130 MG: 10 INJECTION, EMULSION INTRAVENOUS at 07:35

## 2024-08-27 RX ADMIN — PROPOFOL 30 MG: 10 INJECTION, EMULSION INTRAVENOUS at 07:43

## 2024-08-27 RX ADMIN — PROPOFOL 20 MG: 10 INJECTION, EMULSION INTRAVENOUS at 07:46

## 2024-08-27 RX ADMIN — LIDOCAINE HYDROCHLORIDE 100 MG: 20 INJECTION, SOLUTION EPIDURAL; INFILTRATION; INTRACAUDAL at 07:35

## 2024-08-27 RX ADMIN — PROPOFOL 20 MG: 10 INJECTION, EMULSION INTRAVENOUS at 07:49

## 2024-08-27 RX ADMIN — SODIUM CHLORIDE, SODIUM LACTATE, POTASSIUM CHLORIDE, AND CALCIUM CHLORIDE 125 ML/HR: .6; .31; .03; .02 INJECTION, SOLUTION INTRAVENOUS at 07:23

## 2024-08-27 RX ADMIN — PROPOFOL 20 MG: 10 INJECTION, EMULSION INTRAVENOUS at 07:40

## 2024-08-27 RX ADMIN — PROPOFOL 30 MG: 10 INJECTION, EMULSION INTRAVENOUS at 07:38

## 2024-08-27 NOTE — ANESTHESIA POSTPROCEDURE EVALUATION
Post-Op Assessment Note    CV Status:  Stable         Mental Status:  Alert and awake   Hydration Status:  Stable   PONV Controlled:  None   Airway Patency:  Patent     Post Op Vitals Reviewed: Yes    No anethesia notable event occurred.    Staff: CRNA               BP   122/71   Temp   97.8   Pulse  72   Resp   14   SpO2   94

## 2024-08-27 NOTE — H&P
History and Physical - [facility]  Severiano Orta 44 y.o. male MRN: [id number]                  HPI: Severiano Orta is a 44 y.o. year old male who presents for colonoscopy for CRC screening.       REVIEW OF SYSTEMS: Per the HPI, and otherwise unremarkable.    Historical Information   Past Medical History:   Diagnosis Date    Diabetes mellitus, new onset (HCC) 01/03/2023    Mixed hyperlipidemia 01/03/2023    Periodontal disease     Primary hypertension 12/13/2022     History reviewed. No pertinent surgical history.  Social History   Social History     Substance and Sexual Activity   Alcohol Use Not Currently     Social History     Substance and Sexual Activity   Drug Use Never     Social History     Tobacco Use   Smoking Status Never    Passive exposure: Never   Smokeless Tobacco Never     History reviewed. No pertinent family history.    Meds/Allergies     Not in a hospital admission.    No Known Allergies    Objective     Blood pressure 163/97, pulse 57, temperature 97.6 °F (36.4 °C), temperature source Temporal, resp. rate 16, SpO2 95%.      PHYSICAL EXAM    Gen: NAD  CV: RRR  CHEST: Clear  ABD: soft, NT/ND  EXT: no edema      ASSESSMENT/PLAN:  Severiano Orta is a 44 y.o. year old male who presents for colonoscopy for CRC screening. The patient is stable and optimized for the procedure, we reviewed risk and benefits. Risk include but not limited to infection, bleeding, perforation and missing a lesion.

## 2024-08-27 NOTE — ANESTHESIA PREPROCEDURE EVALUATION
Procedure:  COLONOSCOPY    Relevant Problems   CARDIO   (+) Mixed hyperlipidemia   (+) Primary hypertension      ENDO   (+) Diabetes type 2, controlled (HCC)      GI/HEPATIC   (+) GERD (gastroesophageal reflux disease)      Care Coordination   (+) Uninsured      Other   (+) Obesity (BMI 30-39.9)        Physical Exam    Airway    Mallampati score: II  TM Distance: >3 FB  Neck ROM: full     Dental   No notable dental hx     Cardiovascular  Cardiovascular exam normal    Pulmonary  Pulmonary exam normal     Other Findings        Anesthesia Plan  ASA Score- 2     Anesthesia Type- IV sedation with anesthesia with ASA Monitors.         Additional Monitors:     Airway Plan:            Plan Factors-Exercise tolerance (METS): >4 METS.    Chart reviewed.   Existing labs reviewed.     Patient is not a current smoker. Patient not instructed to abstain from smoking on day of procedure. Patient did not smoke on day of surgery.            Induction- intravenous.    Postoperative Plan-         Informed Consent- Anesthetic plan and risks discussed with patient.  I personally reviewed this patient with the CRNA. Discussed and agreed on the Anesthesia Plan with the CRNA..

## 2024-08-30 PROCEDURE — 88305 TISSUE EXAM BY PATHOLOGIST: CPT | Performed by: PATHOLOGY

## 2024-08-31 NOTE — RESULT ENCOUNTER NOTE
Dear staff: Please kindly communicate with patient that there was 1 polyp removed during colonoscopy which was benign and without significant findings.  Good news.  Since bowel prep was inadequate, it was an incomplete exam and repeat colonoscopy is recommended in 1 year after 2-day prep.    Please also place in recall for colonoscopy in 1 year.  Patient would need 2-day prep prior to procedure.

## 2024-09-03 ENCOUNTER — TELEPHONE (OUTPATIENT)
Dept: GASTROENTEROLOGY | Facility: CLINIC | Age: 44
End: 2024-09-03

## 2024-09-03 NOTE — TELEPHONE ENCOUNTER
----- Message from Musa Arnold MD sent at 8/31/2024  6:00 PM EDT -----  Dear staff: Please kindly communicate with patient that there was 1 polyp removed during colonoscopy which was benign and without significant findings.  Good news.  Since bowel prep was inadequate, it was an incomplete exam and repeat colonoscopy is recommended in 1 year after 2-day prep.    Please also place in recall for colonoscopy in 1 year.  Patient would need 2-day prep prior to procedure.

## 2024-10-15 ENCOUNTER — OFFICE VISIT (OUTPATIENT)
Dept: FAMILY MEDICINE CLINIC | Facility: CLINIC | Age: 44
End: 2024-10-15

## 2024-10-15 VITALS
DIASTOLIC BLOOD PRESSURE: 82 MMHG | HEIGHT: 67 IN | RESPIRATION RATE: 18 BRPM | WEIGHT: 212.4 LBS | HEART RATE: 65 BPM | BODY MASS INDEX: 33.34 KG/M2 | TEMPERATURE: 98.2 F | OXYGEN SATURATION: 97 % | SYSTOLIC BLOOD PRESSURE: 146 MMHG

## 2024-10-15 DIAGNOSIS — I10 PRIMARY HYPERTENSION: ICD-10-CM

## 2024-10-15 DIAGNOSIS — E11.9 CONTROLLED TYPE 2 DIABETES MELLITUS WITHOUT COMPLICATION, WITHOUT LONG-TERM CURRENT USE OF INSULIN (HCC): Primary | ICD-10-CM

## 2024-10-15 DIAGNOSIS — E78.2 MIXED HYPERLIPIDEMIA: ICD-10-CM

## 2024-10-15 DIAGNOSIS — K21.9 GASTROESOPHAGEAL REFLUX DISEASE WITHOUT ESOPHAGITIS: ICD-10-CM

## 2024-10-15 LAB — SL AMB POCT HEMOGLOBIN AIC: 8.4 (ref ?–6.5)

## 2024-10-15 PROCEDURE — 99214 OFFICE O/P EST MOD 30 MIN: CPT

## 2024-10-15 PROCEDURE — 83036 HEMOGLOBIN GLYCOSYLATED A1C: CPT

## 2024-10-15 RX ORDER — GLIPIZIDE 5 MG/1
5 TABLET ORAL
Qty: 90 TABLET | Refills: 2 | Status: SHIPPED | OUTPATIENT
Start: 2024-10-15

## 2024-10-15 RX ORDER — ENALAPRIL MALEATE 20 MG/1
20 TABLET ORAL DAILY
Qty: 90 TABLET | Refills: 1 | Status: SHIPPED | OUTPATIENT
Start: 2024-10-15

## 2024-10-15 NOTE — ASSESSMENT & PLAN NOTE
"Lab Results   Component Value Date    CHOLESTEROL 251 (H) 12/13/2022     Lab Results   Component Value Date    HDL 41 12/13/2022     Lab Results   Component Value Date    TRIG 235 (H) 12/13/2022     No results found for: \"NONHDLC\"  Lab Results   Component Value Date    LDLCALC 163 (H) 12/13/2022     Check lipid panel. Continue Lipitor 40 mg daily       "

## 2024-10-15 NOTE — ASSESSMENT & PLAN NOTE
BP Readings from Last 3 Encounters:   10/15/24 146/82   08/27/24 124/73   07/16/24 160/94     Reports that he did not take his enalapril today. Continue vasotec.    Orders:    enalapril (VASOTEC) 20 mg tablet; Take 1 tablet (20 mg total) by mouth daily

## 2024-10-15 NOTE — ASSESSMENT & PLAN NOTE
Lab Results   Component Value Date    HGBA1C 8.4 (A) 10/15/2024   Continue metformin 1000 mg BID, increase glipizide from 5 mg daily to BID.     Orders:    POCT hemoglobin A1c    glipiZIDE (GLUCOTROL) 5 mg tablet; Take 1 tablet (5 mg total) by mouth 2 (two) times a day before meals

## 2024-10-15 NOTE — PROGRESS NOTES
"Ambulatory Visit  Name: Severiano Orta      : 1980      MRN: [id number]  Encounter Provider: JASWANT Tapia  Encounter Date: 10/15/2024   Encounter department: [facility]    Assessment & Plan  Controlled type 2 diabetes mellitus without complication, without long-term current use of insulin (HCC)    Lab Results   Component Value Date    HGBA1C 8.4 (A) 10/15/2024   Continue metformin 1000 mg BID, increase glipizide from 5 mg daily to BID.     Orders:    POCT hemoglobin A1c    glipiZIDE (GLUCOTROL) 5 mg tablet; Take 1 tablet (5 mg total) by mouth 2 (two) times a day before meals    Primary hypertension  BP Readings from Last 3 Encounters:   10/15/24 146/82   24 124/73   24 160/94     Reports that he did not take his enalapril today. Continue vasotec.    Orders:    enalapril (VASOTEC) 20 mg tablet; Take 1 tablet (20 mg total) by mouth daily    Mixed hyperlipidemia  Lab Results   Component Value Date    CHOLESTEROL 251 (H) 2022     Lab Results   Component Value Date    HDL 41 2022     Lab Results   Component Value Date    TRIG 235 (H) 2022     No results found for: \"NONHDLC\"  Lab Results   Component Value Date    LDLCALC 163 (H) 2022     Check lipid panel. Continue Lipitor 40 mg daily       Gastroesophageal reflux disease without esophagitis  Continue omeprazole 40 mg daily.             History of Present Illness     Severiano Orta is a 44 y.o. male  has a past medical history of Diabetes mellitus, new onset (HCC), Mixed hyperlipidemia, Periodontal disease, and Primary hypertension.  has no past surgical history on file.    Pt presents today for follow-up of DM.          Review of Systems   Constitutional:  Negative for chills and fever.   HENT:  Negative for ear pain and sore throat.    Eyes:  Negative for pain and visual disturbance.   Respiratory:  Negative for cough and shortness of breath.    Cardiovascular:  " "Negative for chest pain and palpitations.   Gastrointestinal:  Negative for abdominal pain and vomiting.   Genitourinary:  Negative for dysuria and hematuria.   Musculoskeletal:  Negative for arthralgias and back pain.   Skin:  Negative for color change and rash.   Neurological:  Negative for seizures and syncope.   All other systems reviewed and are negative.          Objective     /82 (BP Location: Left arm, Patient Position: Sitting, Cuff Size: Large) Comment: Pt did not take HTN meds  Pulse 65   Temp 98.2 °F (36.8 °C) (Temporal)   Resp 18   Ht 5' 7\" (1.702 m)   Wt 96.3 kg (212 lb 6.4 oz)   SpO2 97%   BMI 33.27 kg/m²     Physical Exam  Vitals and nursing note reviewed.   Constitutional:       General: He is not in acute distress.     Appearance: Normal appearance. He is well-developed. He is obese.   HENT:      Head: Normocephalic and atraumatic.      Right Ear: External ear normal.      Left Ear: External ear normal.      Nose: Nose normal.   Eyes:      Conjunctiva/sclera: Conjunctivae normal.   Cardiovascular:      Rate and Rhythm: Normal rate and regular rhythm.      Pulses: Normal pulses.      Heart sounds: Normal heart sounds. No murmur heard.  Pulmonary:      Effort: Pulmonary effort is normal. No respiratory distress.      Breath sounds: Normal breath sounds.   Abdominal:      General: Bowel sounds are normal.      Palpations: Abdomen is soft.      Tenderness: There is no abdominal tenderness.   Musculoskeletal:         General: No swelling. Normal range of motion.      Cervical back: Normal range of motion and neck supple.   Skin:     General: Skin is warm and dry.      Capillary Refill: Capillary refill takes less than 2 seconds.   Neurological:      General: No focal deficit present.      Mental Status: He is alert and oriented to person, place, and time. Mental status is at baseline.   Psychiatric:         Mood and Affect: Mood normal.         Behavior: Behavior normal.         Thought " Content: Thought content normal.         Judgment: Judgment normal.

## 2025-01-14 ENCOUNTER — OFFICE VISIT (OUTPATIENT)
Dept: FAMILY MEDICINE CLINIC | Facility: CLINIC | Age: 45
End: 2025-01-14

## 2025-01-14 VITALS
RESPIRATION RATE: 18 BRPM | HEIGHT: 67 IN | HEART RATE: 71 BPM | OXYGEN SATURATION: 96 % | DIASTOLIC BLOOD PRESSURE: 94 MMHG | TEMPERATURE: 97.7 F | WEIGHT: 208.5 LBS | BODY MASS INDEX: 32.72 KG/M2 | SYSTOLIC BLOOD PRESSURE: 162 MMHG

## 2025-01-14 DIAGNOSIS — I10 PRIMARY HYPERTENSION: ICD-10-CM

## 2025-01-14 DIAGNOSIS — E11.9 CONTROLLED TYPE 2 DIABETES MELLITUS WITHOUT COMPLICATION, WITHOUT LONG-TERM CURRENT USE OF INSULIN (HCC): Primary | ICD-10-CM

## 2025-01-14 DIAGNOSIS — E78.2 MIXED HYPERLIPIDEMIA: ICD-10-CM

## 2025-01-14 LAB — SL AMB POCT HEMOGLOBIN AIC: 10 (ref ?–6.5)

## 2025-01-14 PROCEDURE — 83036 HEMOGLOBIN GLYCOSYLATED A1C: CPT

## 2025-01-14 PROCEDURE — 99214 OFFICE O/P EST MOD 30 MIN: CPT

## 2025-01-14 NOTE — PROGRESS NOTES
"Name: Severiano Orta      : 1980      MRN: [id number]  Encounter Provider: JASWANT Tapia  Encounter Date: 2025   Encounter department: [facility]  :  Assessment & Plan  Controlled type 2 diabetes mellitus without complication, without long-term current use of insulin (HCC)    Lab Results   Component Value Date    HGBA1C 10.0 (A) 2025     Discussed dietary changes and reduce carb, increase protein and vegetable and recommended visiting ADA for dietary recommendations. Patient agreeable to starting to take metformin 1000 mg BID & Glipizide 5 mg BID.   Orders:    POCT hemoglobin A1c    Primary hypertension  BP Readings from Last 3 Encounters:   25 162/94   10/15/24 146/82   24 124/73   -reviewed eating a low salt diet (such as the DASH diet), limiting alcohol, exercising, and wt loss.  RTC in two weeks for BP check.        Mixed hyperlipidemia  Lab Results   Component Value Date    CHOLESTEROL 251 (H) 2022     Lab Results   Component Value Date    HDL 41 2022     Lab Results   Component Value Date    TRIG 235 (H) 2022     No results found for: \"NONHDLC\"  Lab Results   Component Value Date    LDLCALC 163 (H) 2022     Continue statin. Check lipid panel.             BMI Counseling: Body mass index is 32.66 kg/m². The BMI is above normal. Nutrition recommendations include decreasing portion sizes, encouraging healthy choices of fruits and vegetables, decreasing fast food intake, consuming healthier snacks, limiting drinks that contain sugar, moderation in carbohydrate intake, increasing intake of lean protein, reducing intake of saturated and trans fat and reducing intake of cholesterol. Exercise recommendations include moderate physical activity 150 minutes/week. No pharmacotherapy was ordered. Rationale for BMI follow-up plan is due to patient being overweight or obese.     Depression Screening and Follow-up " "Plan: Patient was screened for depression during today's encounter. They screened negative with a PHQ-2 score of 0.      History of Present Illness     Patient is a 44 year old male presenting for diabetes and hypertension. Patient states not taking his metformin or glipizide at evening time/night and takes his antihypertensive daily except that he did not take it today. States checking his blood glucose in the morning and usually its 120-130's range. Also checks BP and it is usually 125/100. Has been eating more rice, potatoes and drinking soda lately.      Review of Systems   Constitutional: Negative.  Negative for chills and fever.   HENT:  Negative for ear pain and sore throat.    Eyes:  Negative for pain and visual disturbance.   Respiratory:  Negative for cough and shortness of breath.    Cardiovascular:  Negative for chest pain and palpitations.   Gastrointestinal:  Negative for abdominal pain and vomiting.   Genitourinary:  Negative for dysuria and hematuria.   Musculoskeletal:  Negative for arthralgias and back pain.   Skin:  Negative for color change and rash.   Neurological:  Negative for seizures and syncope.   All other systems reviewed and are negative.      Objective   /94 (BP Location: Left arm, Patient Position: Sitting, Cuff Size: Standard) Comment: Pt did not take bp meds today  Pulse 71   Temp 97.7 °F (36.5 °C) (Temporal)   Resp 18   Ht 5' 7\" (1.702 m)   Wt 94.6 kg (208 lb 8 oz)   SpO2 96%   BMI 32.66 kg/m²      Physical Exam  Vitals and nursing note reviewed.   Constitutional:       General: He is not in acute distress.     Appearance: Normal appearance. He is well-developed. He is obese.   HENT:      Head: Normocephalic and atraumatic.      Right Ear: External ear normal.      Left Ear: External ear normal.      Nose: Nose normal.   Eyes:      Conjunctiva/sclera: Conjunctivae normal.   Cardiovascular:      Rate and Rhythm: Normal rate and regular rhythm.      Pulses: Normal pulses. "      Heart sounds: Normal heart sounds. No murmur heard.  Pulmonary:      Effort: Pulmonary effort is normal. No respiratory distress.      Breath sounds: Normal breath sounds.   Abdominal:      Palpations: Abdomen is soft.      Tenderness: There is no abdominal tenderness.   Musculoskeletal:         General: No swelling. Normal range of motion.      Cervical back: Normal range of motion and neck supple.   Skin:     General: Skin is warm and dry.      Capillary Refill: Capillary refill takes less than 2 seconds.   Neurological:      General: No focal deficit present.      Mental Status: He is alert and oriented to person, place, and time. Mental status is at baseline.   Psychiatric:         Mood and Affect: Mood normal.         Behavior: Behavior normal.         Thought Content: Thought content normal.         Judgment: Judgment normal.

## 2025-01-14 NOTE — ASSESSMENT & PLAN NOTE
Lab Results   Component Value Date    HGBA1C 10.0 (A) 01/14/2025     Discussed dietary changes and reduce carb, increase protein and vegetable and recommended visiting ADA for dietary recommendations. Patient agreeable to starting to take metformin 1000 mg BID & Glipizide 5 mg BID.   Orders:    POCT hemoglobin A1c

## 2025-01-14 NOTE — ASSESSMENT & PLAN NOTE
"Lab Results   Component Value Date    CHOLESTEROL 251 (H) 12/13/2022     Lab Results   Component Value Date    HDL 41 12/13/2022     Lab Results   Component Value Date    TRIG 235 (H) 12/13/2022     No results found for: \"NONHDLC\"  Lab Results   Component Value Date    LDLCALC 163 (H) 12/13/2022     Continue statin. Check lipid panel.         "

## 2025-01-14 NOTE — ASSESSMENT & PLAN NOTE
BP Readings from Last 3 Encounters:   01/14/25 162/94   10/15/24 146/82   08/27/24 124/73   -reviewed eating a low salt diet (such as the DASH diet), limiting alcohol, exercising, and wt loss.  RTC in two weeks for BP check.

## 2025-01-28 ENCOUNTER — OFFICE VISIT (OUTPATIENT)
Dept: FAMILY MEDICINE CLINIC | Facility: CLINIC | Age: 45
End: 2025-01-28

## 2025-01-28 VITALS
OXYGEN SATURATION: 96 % | WEIGHT: 207.1 LBS | DIASTOLIC BLOOD PRESSURE: 88 MMHG | HEART RATE: 72 BPM | HEIGHT: 67 IN | TEMPERATURE: 97.8 F | SYSTOLIC BLOOD PRESSURE: 138 MMHG | RESPIRATION RATE: 18 BRPM | BODY MASS INDEX: 32.51 KG/M2

## 2025-01-28 DIAGNOSIS — E11.9 CONTROLLED TYPE 2 DIABETES MELLITUS WITHOUT COMPLICATION, WITHOUT LONG-TERM CURRENT USE OF INSULIN (HCC): ICD-10-CM

## 2025-01-28 DIAGNOSIS — I10 PRIMARY HYPERTENSION: Primary | ICD-10-CM

## 2025-01-28 PROCEDURE — 99213 OFFICE O/P EST LOW 20 MIN: CPT

## 2025-01-28 RX ORDER — GLIPIZIDE 5 MG/1
5 TABLET ORAL
Qty: 90 TABLET | Refills: 2 | Status: SHIPPED | OUTPATIENT
Start: 2025-01-28

## 2025-01-28 NOTE — ASSESSMENT & PLAN NOTE
Lab Results   Component Value Date    HGBA1C 10.0 (A) 01/14/2025       Orders:    glipiZIDE (GLUCOTROL) 5 mg tablet; Take 1 tablet (5 mg total) by mouth 2 (two) times a day before meals

## 2025-01-28 NOTE — ASSESSMENT & PLAN NOTE
BP Readings from Last 3 Encounters:   01/28/25 138/88   01/14/25 162/94   10/15/24 146/82     - At goal. Continue Vasotec 20 mg daily.

## 2025-01-28 NOTE — PROGRESS NOTES
"Name: Severiano Orta      : 1980      MRN: [id number]  Encounter Provider: JASWANT Tapia  Encounter Date: 2025   Encounter department: [facility]  :  Assessment & Plan  Primary hypertension  BP Readings from Last 3 Encounters:   25 138/88   25 162/94   10/15/24 146/82     - At goal. Continue Vasotec 20 mg daily.          Controlled type 2 diabetes mellitus without complication, without long-term current use of insulin ([facility])    Lab Results   Component Value Date    HGBA1C 10.0 (A) 2025       Orders:    glipiZIDE (GLUCOTROL) 5 mg tablet; Take 1 tablet (5 mg total) by mouth 2 (two) times a day before meals           History of Present Illness       Patient is a 45 year old male presenting for HTN follow up. Report reducing fast food and drinking more water and decreasing carbonated beverages.  Compliant with all medication.      Review of Systems   Constitutional:  Negative for chills and fever.   HENT:  Negative for ear pain and sore throat.    Eyes:  Negative for pain and visual disturbance.   Respiratory:  Negative for cough and shortness of breath.    Cardiovascular:  Negative for chest pain and palpitations.   Gastrointestinal:  Negative for abdominal pain and vomiting.   Genitourinary:  Negative for dysuria and hematuria.   Musculoskeletal:  Negative for arthralgias and back pain.   Skin:  Negative for color change and rash.   Neurological:  Negative for seizures and syncope.   All other systems reviewed and are negative.      Objective   /88 (BP Location: Left arm, Patient Position: Sitting, Cuff Size: Standard)   Pulse 72   Temp 97.8 °F (36.6 °C) (Temporal)   Resp 18   Ht 5' 7\" (1.702 m)   Wt 93.9 kg (207 lb 1.6 oz)   SpO2 96%   BMI 32.44 kg/m²      Physical Exam  Vitals and nursing note reviewed.   Constitutional:       General: He is not in acute distress.     Appearance: He is well-developed and overweight. "   HENT:      Head: Normocephalic and atraumatic.      Right Ear: External ear normal.      Left Ear: External ear normal.      Nose: Nose normal.   Eyes:      Conjunctiva/sclera: Conjunctivae normal.   Cardiovascular:      Rate and Rhythm: Normal rate and regular rhythm.      Pulses: Normal pulses.      Heart sounds: Normal heart sounds. No murmur heard.  Pulmonary:      Effort: Pulmonary effort is normal. No respiratory distress.      Breath sounds: Normal breath sounds.   Abdominal:      Palpations: Abdomen is soft.      Tenderness: There is no abdominal tenderness.   Musculoskeletal:         General: No swelling. Normal range of motion.      Cervical back: Normal range of motion and neck supple.   Skin:     General: Skin is warm and dry.      Capillary Refill: Capillary refill takes less than 2 seconds.   Neurological:      General: No focal deficit present.      Mental Status: He is alert and oriented to person, place, and time. Mental status is at baseline.   Psychiatric:         Mood and Affect: Mood normal.         Behavior: Behavior normal.         Thought Content: Thought content normal.         Judgment: Judgment normal.

## 2025-06-01 DIAGNOSIS — I10 PRIMARY HYPERTENSION: ICD-10-CM

## 2025-06-02 RX ORDER — ENALAPRIL MALEATE 20 MG/1
20 TABLET ORAL DAILY
Qty: 90 TABLET | Refills: 1 | Status: SHIPPED | OUTPATIENT
Start: 2025-06-02

## 2025-06-10 ENCOUNTER — OFFICE VISIT (OUTPATIENT)
Dept: DENTISTRY | Facility: CLINIC | Age: 45
End: 2025-06-10

## 2025-06-10 VITALS — HEART RATE: 71 BPM | DIASTOLIC BLOOD PRESSURE: 99 MMHG | TEMPERATURE: 99.1 F | SYSTOLIC BLOOD PRESSURE: 160 MMHG

## 2025-06-10 DIAGNOSIS — Z01.20 ENCOUNTER FOR DENTAL EXAMINATION: Primary | ICD-10-CM

## 2025-06-10 PROCEDURE — D0274 BITEWINGS - 4 RADIOGRAPHIC IMAGES: HCPCS | Performed by: DENTIST

## 2025-06-10 PROCEDURE — D0150 COMPREHENSIVE ORAL EVALUATION - NEW OR ESTABLISHED PATIENT: HCPCS | Performed by: DENTIST

## 2025-06-11 NOTE — PROGRESS NOTES
Procedure Details   - COMPREHENSIVE ORAL EVALUATION - NEW OR ESTABLISHED PATIENT     COMP EXAM, FMX, PROBE EXAM   REVIEWED MED HX: meds, allergies, health changes reviewed in EPIC  CHIEF CONCERN:  No particular concern was discussed.   -Last dental visit was 4/30/25  PAIN SCALE:  0  ASA CLASS:  ASA 2 - Patient with mild systemic disease with no functional limitations  PLAQUE:  mild  CALCULUS: Moderate  BLEEDING:  moderate  STAIN :  Localized  PERIO: Patient has multiple 4, 5, 6, and 7 mm pockets. Probing completed today.    Visual and Tactile Intraoral/ Extraoral evaluation: Oral and Oropharyngeal cancer evaluation. No findings     Dr. Miller -  Reviewed with patient clinical and radiographic findings and patient verbalized understanding. All questions and concerns addressed.     REFERRALS: None    CARIES FINDINGS:   #3b, d  #15 do  #16 o  #17 o  #20 do  #31 o    #26 root tip with crown intact. Extraction of 26 indicated. Patient informed that the bridge would need to be sectioned. Replacement of teeth was discussed - Implants if cost allows, partial dentures. Bridges were discouraged due to periodontal health at this time.Patient was informed that goals will be first periodontal health and restorations before determining replacement of teeth.       NEXT VISIT:   1)SRP, restorative.    Last FMX :  Last PAN: 4/23/24  Last Bws: 6/10/25   - BITEWINGS - 4 RADIOGRAPHIC IMAGES

## 2025-06-11 NOTE — DENTAL PROCEDURE DETAILS
COMP EXAM, FMX, PROBE EXAM   REVIEWED MED HX: meds, allergies, health changes reviewed in EPIC  CHIEF CONCERN:  No particular concern was discussed.   -Last dental visit was 4/30/25  PAIN SCALE:  0  ASA CLASS:  ASA 2 - Patient with mild systemic disease with no functional limitations  PLAQUE:  mild  CALCULUS: Moderate  BLEEDING:  moderate  STAIN :  Localized  PERIO: Patient has multiple 4, 5, 6, and 7 mm pockets. Probing completed today.    Visual and Tactile Intraoral/ Extraoral evaluation: Oral and Oropharyngeal cancer evaluation. No findings     Dr. Miller -  Reviewed with patient clinical and radiographic findings and patient verbalized understanding. All questions and concerns addressed.     REFERRALS: None    CARIES FINDINGS:   #3b, d  #15 do  #16 o  #17 o  #20 do  #31 o    #26 root tip with crown intact. Extraction of 26 indicated. Patient informed that the bridge would need to be sectioned. Replacement of teeth was discussed - Implants if cost allows, partial dentures. Bridges were discouraged due to periodontal health at this time.Patient was informed that goals will be first periodontal health and restorations before determining replacement of teeth.       NEXT VISIT:   1)SRP, restorative.    Last FMX :  Last PAN: 4/23/24  Last Bws: 6/10/25

## 2025-07-02 ENCOUNTER — TELEPHONE (OUTPATIENT)
Dept: FAMILY MEDICINE CLINIC | Facility: CLINIC | Age: 45
End: 2025-07-02

## 2025-07-02 DIAGNOSIS — I10 PRIMARY HYPERTENSION: ICD-10-CM

## 2025-07-02 RX ORDER — ENALAPRIL MALEATE 20 MG/1
20 TABLET ORAL DAILY
Qty: 90 TABLET | Refills: 1 | Status: SHIPPED | OUTPATIENT
Start: 2025-07-02

## 2025-07-02 NOTE — TELEPHONE ENCOUNTER
patient call that the pharmacy don't have his prescription . I call the pharmacy and they told me they did not receive a order from us on 06/02/250enalapril (VASOTEC) 20 mg tablet can some one resend this medication to the new pharmacy listed on his chart.

## 2025-07-03 ENCOUNTER — TELEPHONE (OUTPATIENT)
Dept: FAMILY MEDICINE CLINIC | Facility: CLINIC | Age: 45
End: 2025-07-03